# Patient Record
Sex: FEMALE | Race: WHITE | Employment: OTHER | URBAN - METROPOLITAN AREA
[De-identification: names, ages, dates, MRNs, and addresses within clinical notes are randomized per-mention and may not be internally consistent; named-entity substitution may affect disease eponyms.]

---

## 2022-09-26 ENCOUNTER — HOSPITAL ENCOUNTER (INPATIENT)
Age: 87
LOS: 4 days | Discharge: NURSING, REHAB OR LONG-TERM CARE | DRG: 689 | End: 2022-09-30
Attending: STUDENT IN AN ORGANIZED HEALTH CARE EDUCATION/TRAINING PROGRAM | Admitting: STUDENT IN AN ORGANIZED HEALTH CARE EDUCATION/TRAINING PROGRAM
Payer: COMMERCIAL

## 2022-09-26 ENCOUNTER — APPOINTMENT (OUTPATIENT)
Dept: CT IMAGING | Age: 87
DRG: 689 | End: 2022-09-26
Payer: COMMERCIAL

## 2022-09-26 ENCOUNTER — APPOINTMENT (OUTPATIENT)
Dept: GENERAL RADIOLOGY | Age: 87
DRG: 689 | End: 2022-09-26
Payer: COMMERCIAL

## 2022-09-26 DIAGNOSIS — N17.9 AKI (ACUTE KIDNEY INJURY) (HCC): Primary | ICD-10-CM

## 2022-09-26 DIAGNOSIS — N30.00 ACUTE CYSTITIS WITHOUT HEMATURIA: ICD-10-CM

## 2022-09-26 PROBLEM — N39.0 UTI (URINARY TRACT INFECTION): Status: ACTIVE | Noted: 2022-09-26

## 2022-09-26 LAB
A/G RATIO: 0.9 (ref 1.1–2.2)
ALBUMIN SERPL-MCNC: 3.6 G/DL (ref 3.4–5)
ALP BLD-CCNC: 116 U/L (ref 40–129)
ALT SERPL-CCNC: 19 U/L (ref 10–40)
ANION GAP SERPL CALCULATED.3IONS-SCNC: 15 MMOL/L (ref 3–16)
AST SERPL-CCNC: 24 U/L (ref 15–37)
BACTERIA: ABNORMAL /HPF
BASOPHILS ABSOLUTE: 0 K/UL (ref 0–0.2)
BASOPHILS RELATIVE PERCENT: 0.4 %
BILIRUB SERPL-MCNC: 0.4 MG/DL (ref 0–1)
BILIRUBIN URINE: NEGATIVE
BLOOD, URINE: ABNORMAL
BUN BLDV-MCNC: 38 MG/DL (ref 7–20)
CALCIUM SERPL-MCNC: 9.8 MG/DL (ref 8.3–10.6)
CHLORIDE BLD-SCNC: 103 MMOL/L (ref 99–110)
CLARITY: ABNORMAL
CO2: 21 MMOL/L (ref 21–32)
COLOR: ABNORMAL
CREAT SERPL-MCNC: 1.4 MG/DL (ref 0.6–1.2)
EOSINOPHILS ABSOLUTE: 0.1 K/UL (ref 0–0.6)
EOSINOPHILS RELATIVE PERCENT: 0.9 %
EPITHELIAL CELLS, UA: 3 /HPF (ref 0–5)
GFR AFRICAN AMERICAN: 43
GFR NON-AFRICAN AMERICAN: 36
GLUCOSE BLD-MCNC: 99 MG/DL (ref 70–99)
GLUCOSE URINE: NEGATIVE MG/DL
HCT VFR BLD CALC: 40.5 % (ref 36–48)
HEMOGLOBIN: 13.1 G/DL (ref 12–16)
HYALINE CASTS: 18 /LPF (ref 0–8)
KETONES, URINE: ABNORMAL MG/DL
LACTIC ACID, SEPSIS: 1.5 MMOL/L (ref 0.4–1.9)
LEUKOCYTE ESTERASE, URINE: ABNORMAL
LYMPHOCYTES ABSOLUTE: 0.5 K/UL (ref 1–5.1)
LYMPHOCYTES RELATIVE PERCENT: 4.2 %
MCH RBC QN AUTO: 31.1 PG (ref 26–34)
MCHC RBC AUTO-ENTMCNC: 32.5 G/DL (ref 31–36)
MCV RBC AUTO: 95.8 FL (ref 80–100)
MICROSCOPIC EXAMINATION: YES
MONOCYTES ABSOLUTE: 0.8 K/UL (ref 0–1.3)
MONOCYTES RELATIVE PERCENT: 6.4 %
NEUTROPHILS ABSOLUTE: 11.3 K/UL (ref 1.7–7.7)
NEUTROPHILS RELATIVE PERCENT: 88.1 %
NITRITE, URINE: POSITIVE
PDW BLD-RTO: 14.5 % (ref 12.4–15.4)
PH UA: 5 (ref 5–8)
PLATELET # BLD: 229 K/UL (ref 135–450)
PMV BLD AUTO: 7.8 FL (ref 5–10.5)
POTASSIUM SERPL-SCNC: 4.7 MMOL/L (ref 3.5–5.1)
PROTEIN UA: 300 MG/DL
RBC # BLD: 4.23 M/UL (ref 4–5.2)
RBC UA: 21 /HPF (ref 0–4)
SODIUM BLD-SCNC: 139 MMOL/L (ref 136–145)
SPECIFIC GRAVITY UA: 1.02 (ref 1–1.03)
TOTAL CK: 91 U/L (ref 26–192)
TOTAL PROTEIN: 7.6 G/DL (ref 6.4–8.2)
TROPONIN: 0.03 NG/ML
TROPONIN: 0.03 NG/ML
URINE REFLEX TO CULTURE: YES
URINE TYPE: ABNORMAL
UROBILINOGEN, URINE: 1 E.U./DL
WBC # BLD: 12.9 K/UL (ref 4–11)
WBC UA: 658 /HPF (ref 0–5)

## 2022-09-26 PROCEDURE — 82550 ASSAY OF CK (CPK): CPT

## 2022-09-26 PROCEDURE — 96374 THER/PROPH/DIAG INJ IV PUSH: CPT

## 2022-09-26 PROCEDURE — 99285 EMERGENCY DEPT VISIT HI MDM: CPT

## 2022-09-26 PROCEDURE — 96361 HYDRATE IV INFUSION ADD-ON: CPT

## 2022-09-26 PROCEDURE — 87077 CULTURE AEROBIC IDENTIFY: CPT

## 2022-09-26 PROCEDURE — 80053 COMPREHEN METABOLIC PANEL: CPT

## 2022-09-26 PROCEDURE — 93005 ELECTROCARDIOGRAM TRACING: CPT | Performed by: PHYSICIAN ASSISTANT

## 2022-09-26 PROCEDURE — 6360000002 HC RX W HCPCS: Performed by: PHYSICIAN ASSISTANT

## 2022-09-26 PROCEDURE — 81001 URINALYSIS AUTO W/SCOPE: CPT

## 2022-09-26 PROCEDURE — 85025 COMPLETE CBC W/AUTO DIFF WBC: CPT

## 2022-09-26 PROCEDURE — 84484 ASSAY OF TROPONIN QUANT: CPT

## 2022-09-26 PROCEDURE — 1200000000 HC SEMI PRIVATE

## 2022-09-26 PROCEDURE — 87086 URINE CULTURE/COLONY COUNT: CPT

## 2022-09-26 PROCEDURE — 87186 SC STD MICRODIL/AGAR DIL: CPT

## 2022-09-26 PROCEDURE — 83605 ASSAY OF LACTIC ACID: CPT

## 2022-09-26 PROCEDURE — 74176 CT ABD & PELVIS W/O CONTRAST: CPT

## 2022-09-26 PROCEDURE — 71045 X-RAY EXAM CHEST 1 VIEW: CPT

## 2022-09-26 PROCEDURE — 72125 CT NECK SPINE W/O DYE: CPT

## 2022-09-26 PROCEDURE — 87040 BLOOD CULTURE FOR BACTERIA: CPT

## 2022-09-26 PROCEDURE — 70450 CT HEAD/BRAIN W/O DYE: CPT

## 2022-09-26 PROCEDURE — 2580000003 HC RX 258: Performed by: PHYSICIAN ASSISTANT

## 2022-09-26 PROCEDURE — 36415 COLL VENOUS BLD VENIPUNCTURE: CPT

## 2022-09-26 RX ORDER — 0.9 % SODIUM CHLORIDE 0.9 %
1000 INTRAVENOUS SOLUTION INTRAVENOUS ONCE
Status: COMPLETED | OUTPATIENT
Start: 2022-09-26 | End: 2022-09-26

## 2022-09-26 RX ADMIN — SODIUM CHLORIDE 1000 ML: 9 INJECTION, SOLUTION INTRAVENOUS at 19:15

## 2022-09-26 RX ADMIN — Medication 1000 MG: at 20:24

## 2022-09-26 ASSESSMENT — PAIN SCALES - GENERAL: PAINLEVEL_OUTOF10: 8

## 2022-09-26 ASSESSMENT — ENCOUNTER SYMPTOMS
NAUSEA: 0
RHINORRHEA: 0
COUGH: 0
ABDOMINAL PAIN: 0
SHORTNESS OF BREATH: 0
VOMITING: 0
DIARRHEA: 0

## 2022-09-26 ASSESSMENT — PAIN - FUNCTIONAL ASSESSMENT: PAIN_FUNCTIONAL_ASSESSMENT: 0-10

## 2022-09-26 NOTE — ED NOTES
Pt has a Carty in place which she arrived with. Pt reports it was changed 3 days ago, catheter appears to be older than 3 days, tubing is discolored and soiled. Pt reports she has the catheter because the people where she lives \"experiment\" on her. Carty is draining cloudy dark yellow urine.       Sakshi Lozano RN  09/26/22 6620

## 2022-09-26 NOTE — ED NOTES
Daughter Musa Torres arrived and she reports pt was released from [facility] on Friday 9/23/2022 after being inpatient for 1 month. Pt was then transferred to [facility] and has been there for 3 days until today. She reports there has been some confusion in pt, dx of dementia has not been made but they are aware she has been becoming more confused. Pt was dc from  with Carty in place now.       Leda Cohen RN  09/26/22 1924

## 2022-09-27 PROBLEM — I10 HTN (HYPERTENSION): Status: ACTIVE | Noted: 2022-09-27

## 2022-09-27 PROBLEM — R53.1 GENERALIZED WEAKNESS: Status: ACTIVE | Noted: 2022-09-27

## 2022-09-27 PROBLEM — E03.9 HYPOTHYROIDISM: Status: ACTIVE | Noted: 2022-09-27

## 2022-09-27 LAB
A/G RATIO: 0.9 (ref 1.1–2.2)
ALBUMIN SERPL-MCNC: 3.2 G/DL (ref 3.4–5)
ALP BLD-CCNC: 98 U/L (ref 40–129)
ALT SERPL-CCNC: 14 U/L (ref 10–40)
ANION GAP SERPL CALCULATED.3IONS-SCNC: 9 MMOL/L (ref 3–16)
AST SERPL-CCNC: 15 U/L (ref 15–37)
BASOPHILS ABSOLUTE: 0.1 K/UL (ref 0–0.2)
BASOPHILS RELATIVE PERCENT: 0.6 %
BILIRUB SERPL-MCNC: 0.3 MG/DL (ref 0–1)
BUN BLDV-MCNC: 34 MG/DL (ref 7–20)
CALCIUM SERPL-MCNC: 9.2 MG/DL (ref 8.3–10.6)
CHLORIDE BLD-SCNC: 106 MMOL/L (ref 99–110)
CO2: 27 MMOL/L (ref 21–32)
CREAT SERPL-MCNC: 1 MG/DL (ref 0.6–1.2)
EKG ATRIAL RATE: 89 BPM
EKG DIAGNOSIS: NORMAL
EKG P AXIS: 80 DEGREES
EKG P-R INTERVAL: 168 MS
EKG Q-T INTERVAL: 398 MS
EKG QRS DURATION: 136 MS
EKG QTC CALCULATION (BAZETT): 484 MS
EKG R AXIS: -67 DEGREES
EKG T AXIS: 42 DEGREES
EKG VENTRICULAR RATE: 89 BPM
EOSINOPHILS ABSOLUTE: 0.2 K/UL (ref 0–0.6)
EOSINOPHILS RELATIVE PERCENT: 2.2 %
GFR AFRICAN AMERICAN: >60
GFR NON-AFRICAN AMERICAN: 53
GLUCOSE BLD-MCNC: 100 MG/DL (ref 70–99)
HCT VFR BLD CALC: 34.8 % (ref 36–48)
HEMOGLOBIN: 11.4 G/DL (ref 12–16)
LYMPHOCYTES ABSOLUTE: 0.6 K/UL (ref 1–5.1)
LYMPHOCYTES RELATIVE PERCENT: 7.3 %
MCH RBC QN AUTO: 31.5 PG (ref 26–34)
MCHC RBC AUTO-ENTMCNC: 32.8 G/DL (ref 31–36)
MCV RBC AUTO: 96 FL (ref 80–100)
MONOCYTES ABSOLUTE: 0.6 K/UL (ref 0–1.3)
MONOCYTES RELATIVE PERCENT: 7.5 %
NEUTROPHILS ABSOLUTE: 7 K/UL (ref 1.7–7.7)
NEUTROPHILS RELATIVE PERCENT: 82.4 %
PDW BLD-RTO: 14.5 % (ref 12.4–15.4)
PLATELET # BLD: 198 K/UL (ref 135–450)
PMV BLD AUTO: 7.8 FL (ref 5–10.5)
POTASSIUM REFLEX MAGNESIUM: 4 MMOL/L (ref 3.5–5.1)
RBC # BLD: 3.63 M/UL (ref 4–5.2)
SODIUM BLD-SCNC: 142 MMOL/L (ref 136–145)
TOTAL PROTEIN: 6.6 G/DL (ref 6.4–8.2)
TROPONIN: 0.04 NG/ML
WBC # BLD: 8.5 K/UL (ref 4–11)

## 2022-09-27 PROCEDURE — 94640 AIRWAY INHALATION TREATMENT: CPT

## 2022-09-27 PROCEDURE — 6360000002 HC RX W HCPCS: Performed by: NURSE PRACTITIONER

## 2022-09-27 PROCEDURE — 97530 THERAPEUTIC ACTIVITIES: CPT

## 2022-09-27 PROCEDURE — 97116 GAIT TRAINING THERAPY: CPT

## 2022-09-27 PROCEDURE — 97165 OT EVAL LOW COMPLEX 30 MIN: CPT

## 2022-09-27 PROCEDURE — 6370000000 HC RX 637 (ALT 250 FOR IP): Performed by: INTERNAL MEDICINE

## 2022-09-27 PROCEDURE — 36415 COLL VENOUS BLD VENIPUNCTURE: CPT

## 2022-09-27 PROCEDURE — 94761 N-INVAS EAR/PLS OXIMETRY MLT: CPT

## 2022-09-27 PROCEDURE — 1200000000 HC SEMI PRIVATE

## 2022-09-27 PROCEDURE — 2580000003 HC RX 258: Performed by: NURSE PRACTITIONER

## 2022-09-27 PROCEDURE — 97535 SELF CARE MNGMENT TRAINING: CPT

## 2022-09-27 PROCEDURE — 6370000000 HC RX 637 (ALT 250 FOR IP): Performed by: STUDENT IN AN ORGANIZED HEALTH CARE EDUCATION/TRAINING PROGRAM

## 2022-09-27 PROCEDURE — 93010 ELECTROCARDIOGRAM REPORT: CPT | Performed by: INTERNAL MEDICINE

## 2022-09-27 PROCEDURE — 2700000000 HC OXYGEN THERAPY PER DAY

## 2022-09-27 PROCEDURE — 85025 COMPLETE CBC W/AUTO DIFF WBC: CPT

## 2022-09-27 PROCEDURE — 84484 ASSAY OF TROPONIN QUANT: CPT

## 2022-09-27 PROCEDURE — 6360000002 HC RX W HCPCS: Performed by: INTERNAL MEDICINE

## 2022-09-27 PROCEDURE — 97161 PT EVAL LOW COMPLEX 20 MIN: CPT

## 2022-09-27 PROCEDURE — 80053 COMPREHEN METABOLIC PANEL: CPT

## 2022-09-27 RX ORDER — LANOLIN ALCOHOL/MO/W.PET/CERES
6 CREAM (GRAM) TOPICAL NIGHTLY PRN
COMMUNITY

## 2022-09-27 RX ORDER — ACETAMINOPHEN 650 MG/1
650 SUPPOSITORY RECTAL EVERY 6 HOURS PRN
Status: DISCONTINUED | OUTPATIENT
Start: 2022-09-27 | End: 2022-09-30 | Stop reason: HOSPADM

## 2022-09-27 RX ORDER — LEVOTHYROXINE SODIUM 112 UG/1
112 TABLET ORAL DAILY
COMMUNITY

## 2022-09-27 RX ORDER — TROSPIUM CHLORIDE 20 MG/1
20 TABLET, FILM COATED ORAL DAILY
Status: DISCONTINUED | OUTPATIENT
Start: 2022-09-27 | End: 2022-09-30 | Stop reason: HOSPADM

## 2022-09-27 RX ORDER — SENNA AND DOCUSATE SODIUM 50; 8.6 MG/1; MG/1
2 TABLET, FILM COATED ORAL NIGHTLY
Status: DISCONTINUED | OUTPATIENT
Start: 2022-09-27 | End: 2022-09-30 | Stop reason: HOSPADM

## 2022-09-27 RX ORDER — SENNA AND DOCUSATE SODIUM 50; 8.6 MG/1; MG/1
2 TABLET, FILM COATED ORAL NIGHTLY
Status: ON HOLD | COMMUNITY
End: 2022-09-29 | Stop reason: HOSPADM

## 2022-09-27 RX ORDER — NIFEDIPINE 20 MG/1
60 CAPSULE ORAL DAILY
Status: ON HOLD | COMMUNITY
End: 2022-09-27

## 2022-09-27 RX ORDER — VITAMIN E 268 MG
400 CAPSULE ORAL DAILY
COMMUNITY

## 2022-09-27 RX ORDER — ENOXAPARIN SODIUM 100 MG/ML
40 INJECTION SUBCUTANEOUS DAILY
Status: DISCONTINUED | OUTPATIENT
Start: 2022-09-27 | End: 2022-09-30 | Stop reason: HOSPADM

## 2022-09-27 RX ORDER — SODIUM CHLORIDE, SODIUM LACTATE, POTASSIUM CHLORIDE, CALCIUM CHLORIDE 600; 310; 30; 20 MG/100ML; MG/100ML; MG/100ML; MG/100ML
INJECTION, SOLUTION INTRAVENOUS CONTINUOUS
Status: DISCONTINUED | OUTPATIENT
Start: 2022-09-27 | End: 2022-09-27

## 2022-09-27 RX ORDER — OXYCODONE AND ACETAMINOPHEN 10; 325 MG/1; MG/1
1 TABLET ORAL EVERY 12 HOURS
Status: DISCONTINUED | OUTPATIENT
Start: 2022-09-27 | End: 2022-09-28

## 2022-09-27 RX ORDER — SODIUM CHLORIDE 0.9 % (FLUSH) 0.9 %
5-40 SYRINGE (ML) INJECTION PRN
Status: DISCONTINUED | OUTPATIENT
Start: 2022-09-27 | End: 2022-09-30 | Stop reason: HOSPADM

## 2022-09-27 RX ORDER — POLYETHYLENE GLYCOL 3350 17 G/17G
17 POWDER, FOR SOLUTION ORAL DAILY
Status: DISCONTINUED | OUTPATIENT
Start: 2022-09-28 | End: 2022-09-30 | Stop reason: HOSPADM

## 2022-09-27 RX ORDER — ONDANSETRON 2 MG/ML
4 INJECTION INTRAMUSCULAR; INTRAVENOUS EVERY 6 HOURS PRN
Status: DISCONTINUED | OUTPATIENT
Start: 2022-09-27 | End: 2022-09-30 | Stop reason: HOSPADM

## 2022-09-27 RX ORDER — QUETIAPINE FUMARATE 25 MG/1
12.5 TABLET, FILM COATED ORAL EVERY 6 HOURS PRN
COMMUNITY

## 2022-09-27 RX ORDER — QUETIAPINE FUMARATE 25 MG/1
12.5 TABLET, FILM COATED ORAL EVERY 6 HOURS PRN
Status: DISCONTINUED | OUTPATIENT
Start: 2022-09-27 | End: 2022-09-30 | Stop reason: HOSPADM

## 2022-09-27 RX ORDER — LEVOTHYROXINE SODIUM 112 UG/1
112 TABLET ORAL DAILY
Status: DISCONTINUED | OUTPATIENT
Start: 2022-09-27 | End: 2022-09-30 | Stop reason: HOSPADM

## 2022-09-27 RX ORDER — TAMSULOSIN HYDROCHLORIDE 0.4 MG/1
0.4 CAPSULE ORAL NIGHTLY
COMMUNITY

## 2022-09-27 RX ORDER — ALBUTEROL SULFATE 90 UG/1
2 AEROSOL, METERED RESPIRATORY (INHALATION) PRN
Status: DISCONTINUED | OUTPATIENT
Start: 2022-09-27 | End: 2022-09-29

## 2022-09-27 RX ORDER — SOLIFENACIN SUCCINATE 5 MG/1
5 TABLET, FILM COATED ORAL DAILY
COMMUNITY

## 2022-09-27 RX ORDER — LANOLIN ALCOHOL/MO/W.PET/CERES
6 CREAM (GRAM) TOPICAL NIGHTLY PRN
Status: DISCONTINUED | OUTPATIENT
Start: 2022-09-27 | End: 2022-09-30 | Stop reason: HOSPADM

## 2022-09-27 RX ORDER — ONDANSETRON 4 MG/1
4 TABLET, ORALLY DISINTEGRATING ORAL EVERY 8 HOURS PRN
Status: DISCONTINUED | OUTPATIENT
Start: 2022-09-27 | End: 2022-09-30 | Stop reason: HOSPADM

## 2022-09-27 RX ORDER — HYDROCODONE BITARTRATE AND ACETAMINOPHEN 7.5; 325 MG/1; MG/1
1 TABLET ORAL EVERY 6 HOURS PRN
Status: DISCONTINUED | OUTPATIENT
Start: 2022-09-27 | End: 2022-09-28

## 2022-09-27 RX ORDER — QUETIAPINE FUMARATE 25 MG/1
25 TABLET, FILM COATED ORAL NIGHTLY
COMMUNITY

## 2022-09-27 RX ORDER — ACETAMINOPHEN 325 MG/1
650 TABLET ORAL EVERY 6 HOURS PRN
Status: DISCONTINUED | OUTPATIENT
Start: 2022-09-27 | End: 2022-09-30 | Stop reason: HOSPADM

## 2022-09-27 RX ORDER — NIFEDIPINE 30 MG/1
60 TABLET, EXTENDED RELEASE ORAL DAILY
Status: DISCONTINUED | OUTPATIENT
Start: 2022-09-27 | End: 2022-09-30 | Stop reason: HOSPADM

## 2022-09-27 RX ORDER — SODIUM CHLORIDE 0.9 % (FLUSH) 0.9 %
5-40 SYRINGE (ML) INJECTION EVERY 12 HOURS SCHEDULED
Status: DISCONTINUED | OUTPATIENT
Start: 2022-09-27 | End: 2022-09-30 | Stop reason: HOSPADM

## 2022-09-27 RX ORDER — OXYCODONE AND ACETAMINOPHEN 10; 325 MG/1; MG/1
1 TABLET ORAL EVERY 12 HOURS
COMMUNITY

## 2022-09-27 RX ORDER — ALBUTEROL SULFATE 90 UG/1
2 AEROSOL, METERED RESPIRATORY (INHALATION) 2 TIMES DAILY
Status: DISCONTINUED | OUTPATIENT
Start: 2022-09-27 | End: 2022-09-29

## 2022-09-27 RX ORDER — ENOXAPARIN SODIUM 100 MG/ML
30 INJECTION SUBCUTANEOUS DAILY
Status: DISCONTINUED | OUTPATIENT
Start: 2022-09-27 | End: 2022-09-27

## 2022-09-27 RX ORDER — QUETIAPINE FUMARATE 25 MG/1
12.5 TABLET, FILM COATED ORAL NIGHTLY
Status: DISCONTINUED | OUTPATIENT
Start: 2022-09-27 | End: 2022-09-30 | Stop reason: HOSPADM

## 2022-09-27 RX ORDER — NIFEDIPINE 60 MG/1
60 TABLET, EXTENDED RELEASE ORAL DAILY
COMMUNITY

## 2022-09-27 RX ORDER — POLYETHYLENE GLYCOL 3350 17 G/17G
17 POWDER, FOR SOLUTION ORAL DAILY PRN
Status: DISCONTINUED | OUTPATIENT
Start: 2022-09-27 | End: 2022-09-27

## 2022-09-27 RX ORDER — ALBUTEROL SULFATE 90 UG/1
2 AEROSOL, METERED RESPIRATORY (INHALATION) PRN
COMMUNITY

## 2022-09-27 RX ORDER — SODIUM CHLORIDE 9 MG/ML
INJECTION, SOLUTION INTRAVENOUS PRN
Status: DISCONTINUED | OUTPATIENT
Start: 2022-09-27 | End: 2022-09-30 | Stop reason: HOSPADM

## 2022-09-27 RX ORDER — TAMSULOSIN HYDROCHLORIDE 0.4 MG/1
0.4 CAPSULE ORAL NIGHTLY
Status: DISCONTINUED | OUTPATIENT
Start: 2022-09-27 | End: 2022-09-30 | Stop reason: HOSPADM

## 2022-09-27 RX ORDER — POLYETHYLENE GLYCOL 3350 17 G/17G
17 POWDER, FOR SOLUTION ORAL DAILY PRN
COMMUNITY

## 2022-09-27 RX ADMIN — MICONAZOLE NITRATE: 20 OINTMENT TOPICAL at 13:10

## 2022-09-27 RX ADMIN — Medication 10 ML: at 11:18

## 2022-09-27 RX ADMIN — OXYCODONE AND ACETAMINOPHEN 1 TABLET: 10; 325 TABLET ORAL at 22:39

## 2022-09-27 RX ADMIN — TAMSULOSIN HYDROCHLORIDE 0.4 MG: 0.4 CAPSULE ORAL at 22:40

## 2022-09-27 RX ADMIN — TROSPIUM CHLORIDE 20 MG: 20 TABLET, FILM COATED ORAL at 11:18

## 2022-09-27 RX ADMIN — Medication 2 PUFF: at 19:31

## 2022-09-27 RX ADMIN — NIFEDIPINE 60 MG: 30 TABLET, EXTENDED RELEASE ORAL at 11:18

## 2022-09-27 RX ADMIN — Medication 1000 MG: at 22:40

## 2022-09-27 RX ADMIN — QUETIAPINE FUMARATE 12.5 MG: 25 TABLET ORAL at 16:56

## 2022-09-27 RX ADMIN — SODIUM CHLORIDE, POTASSIUM CHLORIDE, SODIUM LACTATE AND CALCIUM CHLORIDE: 600; 310; 30; 20 INJECTION, SOLUTION INTRAVENOUS at 02:07

## 2022-09-27 RX ADMIN — ENOXAPARIN SODIUM 40 MG: 100 INJECTION SUBCUTANEOUS at 11:18

## 2022-09-27 RX ADMIN — Medication 10 ML: at 22:40

## 2022-09-27 RX ADMIN — QUETIAPINE FUMARATE 12.5 MG: 25 TABLET ORAL at 22:40

## 2022-09-27 RX ADMIN — STANDARDIZED SENNA CONCENTRATE AND DOCUSATE SODIUM 2 TABLET: 8.6; 5 TABLET ORAL at 22:40

## 2022-09-27 RX ADMIN — OXYCODONE AND ACETAMINOPHEN 1 TABLET: 10; 325 TABLET ORAL at 13:12

## 2022-09-27 RX ADMIN — Medication 2 PUFF: at 09:26

## 2022-09-27 RX ADMIN — SODIUM CHLORIDE, PRESERVATIVE FREE 10 ML: 5 INJECTION INTRAVENOUS at 02:07

## 2022-09-27 ASSESSMENT — PAIN DESCRIPTION - LOCATION
LOCATION: BACK;GENERALIZED
LOCATION: KNEE;GENERALIZED
LOCATION: GENERALIZED

## 2022-09-27 ASSESSMENT — PAIN DESCRIPTION - PAIN TYPE: TYPE: CHRONIC PAIN

## 2022-09-27 ASSESSMENT — PAIN SCALES - GENERAL
PAINLEVEL_OUTOF10: 9
PAINLEVEL_OUTOF10: 8
PAINLEVEL_OUTOF10: 10

## 2022-09-27 ASSESSMENT — PAIN DESCRIPTION - ORIENTATION: ORIENTATION: RIGHT;LEFT

## 2022-09-27 ASSESSMENT — PAIN DESCRIPTION - DESCRIPTORS: DESCRIPTORS: ACHING;GNAWING

## 2022-09-27 NOTE — PROGRESS NOTES
4 Eyes Skin Assessment     NAME:  Dara Zapata  YOB: 1935  MEDICAL RECORD NUMBER:  [id number]    The patient is being assess for  Admission    I agree that 2 RN's have performed a thorough Head to Toe Skin Assessment on the patient. ALL assessment sites listed below have been assessed. Areas assessed by both nurses:    Head, Face, Ears, Shoulders, Back, Chest, Arms, Elbows, Hands, Sacrum. Buttock, Coccyx, Ischium, and Legs. Feet and Heels        Does the Patient have a Wound?  No noted wound(s)       Dario Prevention initiated:  Yes   Wound Care Orders initiated:  NA    Pressure Injury (Stage 3,4, Unstageable, DTI, NWPT, and Complex wounds) if present place referral/consult order under [de-identified] NA    New and Established Ostomies if present place consult order under : NA      Nurse 1 eSignature: Electronically signed by Ronny Lopez RN on 9/27/22 at 4:27 AM EDT    **SHARE this note so that the co-signing nurse is able to place an eSignature**    Nurse 2 eSignature: Electronically signed by Juan Toledo RN on 9/27/22 at 7:37 AM EDT

## 2022-09-27 NOTE — ED PROVIDER NOTES
[facility]        Pt Name: Berenice Silvestre  MRN: [id number]  [place] 1935  Date of evaluation: 9/26/2022  Provider: Luis Daniel Chew PA-C  PCP: No primary care provider on file. Note Started: 10:31 PM EDT       DHIRAJ. I have evaluated this patient. My supervising physician was available for consultation. CHIEF COMPLAINT       Chief Complaint   Patient presents with    Fall     Pt arrived via EMS for a Fall at [facility]. Pt fell backwards and hit her head, no known LOC, pt reports she did not lose consciousness, pt awake and alert, pt able to answer orientation questions X 4 but is rambling about situations not related to current events/ appears confused. HISTORY OF PRESENT ILLNESS   (Location, Timing/Onset, Context/Setting, Quality, Duration, Modifying Factors, Severity, Associated Signs and Symptoms)  Note limiting factors. Chief Complaint: Chucho Biswas is a 81 y.o. female who presents emergency department today for evaluation for a fall which occurred shortly before arriving to the ED. The patient is from a [facility]. Apparently the patient fell backwards while trying to get out of bed, and hit her head. There is no loss of consciousness. No vomiting. There is report of another fall possibly tripping over a catheter. The patient arrives to the ED she is alert and oriented x2, does seem to have some transient confusion. Her vital signs are stable. The patient tells me that she has a Carty catheter in place and has this in place for 3 days although she is unsure why. The patient has no headaches. No visual changes. No numbness, tilling or weakness. She has no chest pain pain or shortness of breath. She has no fever or chills. No cough or congestion. No vomiting or diarrhea. She denies any urinary symptoms.   She is not on any blood thinners, no other complaints at this time. Nursing Notes were all reviewed and agreed with or any disagreements were addressed in the HPI. REVIEW OF SYSTEMS    (2-9 systems for level 4, 10 or more for level 5)     Review of Systems   Constitutional:  Negative for activity change, appetite change, chills and fever. HENT:  Negative for congestion and rhinorrhea. Respiratory:  Negative for cough and shortness of breath. Cardiovascular:  Negative for chest pain. Gastrointestinal:  Negative for abdominal pain, diarrhea, nausea and vomiting. Genitourinary:  Negative for difficulty urinating, dysuria and hematuria. Positives and Pertinent negatives as per HPI. Except as noted above in the ROS, all other systems were reviewed and negative. PAST MEDICAL HISTORY   History reviewed. No pertinent past medical history. SURGICAL HISTORY   History reviewed. No pertinent surgical history. CURRENTMEDICATIONS       Previous Medications    No medications on file         ALLERGIES     Patient has no known allergies. FAMILYHISTORY     History reviewed. No pertinent family history. SOCIAL HISTORY       Social History     Tobacco Use    Smoking status: Former     Packs/day: 1.00     Years: 10.00     Pack years: 10.00     Types: Cigarettes       SCREENINGS    Klaudia Coma Scale  Eye Opening: Spontaneous  Best Verbal Response: Oriented  Best Motor Response: Obeys commands  Couch Coma Scale Score: 15        PHYSICAL EXAM    (up to 7 for level 4, 8 or more for level 5)     ED Triage Vitals [09/26/22 1812]   BP Temp Temp Source Heart Rate Resp SpO2 Height Weight   121/75 98.6 °F (37 °C) Oral 89 16 93 % 5' 4\" (1.626 m) 145 lb (65.8 kg)       Physical Exam  Vitals and nursing note reviewed. Constitutional:       Appearance: She is well-developed. She is not diaphoretic. HENT:      Head: Normocephalic and atraumatic.       Right Ear: External ear normal.      Left Ear: External ear normal.      Nose: Nose normal. Mouth/Throat:      Mouth: Mucous membranes are dry. Pharynx: No posterior oropharyngeal erythema. Eyes:      General:         Right eye: No discharge. Left eye: No discharge. Extraocular Movements: Extraocular movements intact. Conjunctiva/sclera: Conjunctivae normal.      Pupils: Pupils are equal, round, and reactive to light. Neck:      Trachea: No tracheal deviation. Cardiovascular:      Rate and Rhythm: Normal rate and regular rhythm. Pulmonary:      Effort: Pulmonary effort is normal. No respiratory distress. Breath sounds: Normal breath sounds. No wheezing or rales. Abdominal:      General: Bowel sounds are normal. There is no distension. Palpations: Abdomen is soft. Tenderness: There is no abdominal tenderness. There is no guarding or rebound. Musculoskeletal:         General: Normal range of motion. Cervical back: Normal range of motion and neck supple. Skin:     General: Skin is warm and dry. Neurological:      General: No focal deficit present. Mental Status: She is alert. She is confused. GCS: GCS eye subscore is 4. GCS verbal subscore is 5. GCS motor subscore is 6. Cranial Nerves: Cranial nerves 2-12 are intact.    Psychiatric:         Behavior: Behavior normal.       DIAGNOSTIC RESULTS   LABS:    Labs Reviewed   CBC WITH AUTO DIFFERENTIAL - Abnormal; Notable for the following components:       Result Value    WBC 12.9 (*)     Neutrophils Absolute 11.3 (*)     Lymphocytes Absolute 0.5 (*)     All other components within normal limits   COMPREHENSIVE METABOLIC PANEL - Abnormal; Notable for the following components:    BUN 38 (*)     Creatinine 1.4 (*)     GFR Non- 36 (*)     GFR African American 43 (*)     Albumin/Globulin Ratio 0.9 (*)     All other components within normal limits   URINALYSIS WITH REFLEX TO CULTURE - Abnormal; Notable for the following components:    Color, UA DARK YELLOW (*)     Clarity, UA TURBID (*)     Ketones, Urine TRACE (*)     Blood, Urine MODERATE (*)     Nitrite, Urine POSITIVE (*)     Leukocyte Esterase, Urine LARGE (*)     All other components within normal limits   MICROSCOPIC URINALYSIS - Abnormal; Notable for the following components:    Bacteria, UA 3+ (*)     Hyaline Casts, UA 18 (*)     WBC,  (*)     RBC, UA 21 (*)     All other components within normal limits   TROPONIN - Abnormal; Notable for the following components:    Troponin 0.03 (*)     All other components within normal limits   TROPONIN - Abnormal; Notable for the following components:    Troponin 0.03 (*)     All other components within normal limits   CULTURE, URINE   CULTURE, BLOOD 2   CULTURE, BLOOD 1   LACTATE, SEPSIS   CK       When ordered only abnormal lab results are displayed. All other labs were within normal range or not returned as of this dictation. EKG: When ordered, EKG's are interpreted by the Emergency Department Physician in the absence of a cardiologist.  Please see their note for interpretation of EKG. RADIOLOGY:   Non-plain film images such as CT, Ultrasound and MRI are read by the radiologist. Plain radiographic images are visualized and preliminarily interpreted by the ED Provider with the below findings:        Interpretation per the Radiologist below, if available at the time of this note:    XR CHEST PORTABLE   Final Result   No acute process. CT HEAD WO CONTRAST   Final Result   Head CT: No acute intracranial abnormality detected. Cervical spine CT: No acute fracture or traumatic malalignment. CT CERVICAL SPINE WO CONTRAST   Final Result   Head CT: No acute intracranial abnormality detected. Cervical spine CT: No acute fracture or traumatic malalignment. CT ABDOMEN PELVIS WO CONTRAST Additional Contrast? None   Final Result   Urinary bladder mural thickening with mild surrounding fat stranding,   suggesting cystitis. Carty catheter is in place. suspicious renal abnormality identified. No hydronephrosis or hydroureter. GI/Bowel: Small hiatal hernia. Stomach otherwise unremarkable. Small bowel unremarkable. There is  moderate diverticulosis of the large bowel, but without convincing CT evidence of diverticulitis. Appendix is normal. Pelvis: Calcified uterine fibroids present. Uterus and adnexa regions otherwise unremarkable for age. No free pelvic fluid. Urinary bladder mural thickening is seen with mild surrounding fat stranding. Carty catheter is in place. Peritoneum/Retroperitoneum: Abdominal aorta normal in caliber. No retroperitoneal lymphadenopathy. Bones/Soft Tissues: No acute or suspicious bony abnormalities are identified. The extra-abdominal and extra pelvic soft tissues are unremarkable. Urinary bladder mural thickening with mild surrounding fat stranding, suggesting cystitis. Carty catheter is in place. Otherwise, no acute abnormality identified. Moderate diverticulosis of the large bowel, but without CT evidence of diverticulitis. Fibroid uterus. CT HEAD WO CONTRAST    Result Date: 9/26/2022  EXAMINATION: CT OF THE HEAD WITHOUT CONTRAST; CT OF THE CERVICAL SPINE WITHOUT CONTRAST 9/26/2022 3:56 pm; 9/26/2022 3:57 pm TECHNIQUE: CT of the head was performed without the administration of intravenous contrast. Automated exposure control, iterative reconstruction, and/or weight based adjustment of the mA/kV was utilized to reduce the radiation dose to as low as reasonably achievable.; CT of the cervical spine was performed without the administration of intravenous contrast. Multiplanar reformatted images are provided for review. Automated exposure control, iterative reconstruction, and/or weight based adjustment of the mA/kV was utilized to reduce the radiation dose to as low as reasonably achievable. COMPARISON: None.  HISTORY: ORDERING SYSTEM PROVIDED HISTORY: fall TECHNOLOGIST PROVIDED HISTORY: Reason for exam:->fall Has a \"code stroke\" or \"stroke alert\" been called? ->No Decision Support Exception - unselect if not a suspected or confirmed emergency medical condition->Emergency Medical Condition (MA) Reason for Exam: Fall, Fall (Pt arrived via EMS for a Fall at [facility]. Pt fell backwards and hit her head, no known LOC, pt reports she did not lose consciousness, pt awake and alert, pt able to answer orientation questions X 4 but is rambling about situations not related to current events/ appears confused. ).; ORDERING SYSTEM PROVIDED HISTORY: fall TECHNOLOGIST PROVIDED HISTORY: Reason for exam:->fall Decision Support Exception - unselect if not a suspected or confirmed emergency medical condition->Emergency Medical Condition (MA) Reason for Exam: Fall, Fall (Pt arrived via EMS for a Fall at [facility]. Pt fell backwards and hit her head, no known LOC, pt reports she did not lose consciousness, pt awake and alert, pt able to answer orientation questions X 4 but is rambling about situations not related to current events/ appears confused. ). FINDINGS: HEAD CT: BRAIN/VENTRICLES:  No acute loss of the gray-white matter differentiation is identified to suggest acute or subacute infarct. No masses or hemorrhages within the brain parenchyma are found. No evidence of midline shift. There is mild periventricular low-attenuation, compatible with chronic small vessel ischemic disease. The intracranial vasculature, including the dural venous sinuses, is within normal limits. ORBITS: No acute orbital abnormalities are identified. SINUSES: The visualized paranasal sinuses and mastoid air cells are clear. SOFT TISSUES/SKULL: The calvarium is intact. Extracranial soft tissues are unremarkable. CERVICAL SPINE CT: BONES/ALIGNMENT: No acute fracture or traumatic malalignment is identified within the cervical spine. Chronic appearing compression deformities involving the T3 and T4 vertebral bodies is noted. DEGENERATIVE CHANGES: Multilevel degenerative disc and facet disease noted. No high-grade central canal stenosis is found. SOFT TISSUES: The prevertebral soft tissues are unremarkable. Emphysema is noted within1 the visualized lungs. Biapical pleural scarring is noted. Head CT: No acute intracranial abnormality detected. Cervical spine CT: No acute fracture or traumatic malalignment. CT CERVICAL SPINE WO CONTRAST    Result Date: 9/26/2022  EXAMINATION: CT OF THE HEAD WITHOUT CONTRAST; CT OF THE CERVICAL SPINE WITHOUT CONTRAST 9/26/2022 3:56 pm; 9/26/2022 3:57 pm TECHNIQUE: CT of the head was performed without the administration of intravenous contrast. Automated exposure control, iterative reconstruction, and/or weight based adjustment of the mA/kV was utilized to reduce the radiation dose to as low as reasonably achievable.; CT of the cervical spine was performed without the administration of intravenous contrast. Multiplanar reformatted images are provided for review. Automated exposure control, iterative reconstruction, and/or weight based adjustment of the mA/kV was utilized to reduce the radiation dose to as low as reasonably achievable. COMPARISON: None. HISTORY: ORDERING SYSTEM PROVIDED HISTORY: fall TECHNOLOGIST PROVIDED HISTORY: Reason for exam:->fall Has a \"code stroke\" or \"stroke alert\" been called? ->No Decision Support Exception - unselect if not a suspected or confirmed emergency medical condition->Emergency Medical Condition (MA) Reason for Exam: Fall, Fall (Pt arrived via EMS for a Fall at [facility]. Pt fell backwards and hit her head, no known LOC, pt reports she did not lose consciousness, pt awake and alert, pt able to answer orientation questions X 4 but is rambling about situations not related to current events/ appears confused.  ).; ORDERING SYSTEM PROVIDED HISTORY: fall TECHNOLOGIST PROVIDED HISTORY: Reason for exam:->fall Decision Support Exception - unselect if not a suspected or confirmed emergency medical condition->Emergency Medical Condition (MA) Reason for Exam: Fall, Fall (Pt arrived via EMS for a Fall at [facility]. Pt fell backwards and hit her head, no known LOC, pt reports she did not lose consciousness, pt awake and alert, pt able to answer orientation questions X 4 but is rambling about situations not related to current events/ appears confused. ). FINDINGS: HEAD CT: BRAIN/VENTRICLES:  No acute loss of the gray-white matter differentiation is identified to suggest acute or subacute infarct. No masses or hemorrhages within the brain parenchyma are found. No evidence of midline shift. There is mild periventricular low-attenuation, compatible with chronic small vessel ischemic disease. The intracranial vasculature, including the dural venous sinuses, is within normal limits. ORBITS: No acute orbital abnormalities are identified. SINUSES: The visualized paranasal sinuses and mastoid air cells are clear. SOFT TISSUES/SKULL: The calvarium is intact. Extracranial soft tissues are unremarkable. CERVICAL SPINE CT: BONES/ALIGNMENT: No acute fracture or traumatic malalignment is identified within the cervical spine. Chronic appearing compression deformities involving the T3 and T4 vertebral bodies is noted. DEGENERATIVE CHANGES: Multilevel degenerative disc and facet disease noted. No high-grade central canal stenosis is found. SOFT TISSUES: The prevertebral soft tissues are unremarkable. Emphysema is noted within1 the visualized lungs. Biapical pleural scarring is noted. Head CT: No acute intracranial abnormality detected. Cervical spine CT: No acute fracture or traumatic malalignment. XR CHEST PORTABLE    Result Date: 9/26/2022  EXAMINATION: ONE XRAY VIEW OF THE CHEST 9/26/2022 6:53 pm COMPARISON: None.  HISTORY: ORDERING SYSTEM PROVIDED HISTORY: fall TECHNOLOGIST PROVIDED HISTORY: Reason for exam:->fall Reason for Exam: fall FINDINGS: The lungs are without acute focal process. There is no effusion or pneumothorax. The cardiomediastinal silhouette is without acute process. The osseous structures are without acute process. No acute process. PROCEDURES   Unless otherwise noted below, none     Procedures    CRITICAL CARE TIME       CONSULTS:  None      EMERGENCY DEPARTMENT COURSE and DIFFERENTIAL DIAGNOSIS/MDM:   Vitals:    Vitals:    09/26/22 1812 09/26/22 2136 09/26/22 2215   BP: 121/75 135/66 134/75   Pulse: 89 88 86   Resp: 16 18 18   Temp: 98.6 °F (37 °C)     TempSrc: Oral     SpO2: 93% 99% 90%   Weight: 145 lb (65.8 kg)     Height: 5' 4\" (1.626 m)         Patient was given the following medications:  Medications   cefTRIAXone (ROCEPHIN) 1000 mg in sterile water 10 mL IV syringe (1,000 mg IntraVENous Given 9/26/22 2024)   0.9 % sodium chloride bolus (0 mLs IntraVENous Stopped 9/26/22 2024)         Is this patient to be included in the SEP-1 Core Measure due to severe sepsis or septic shock? No   Exclusion criteria - the patient is NOT to be included for SEP-1 Core Measure due to:  May have criteria for sepsis, but does not meet criteria for severe sepsis or septic shock    Briefly, this is a 81-year-old female who presents to the emergency department today for evaluation for a fall from a nursing home. The patient apparently fell, while trying to get out of bed, she did hit her head, no loss of consciousness. No vomiting. Patient arrives to the ED she appears to be mildly confused alert and oriented x3. Mucous membranes are dry. Patient has a Carty catheter in place, she tells me that this has been in place for approximately 3 days although she is unsure why. There are no neurological deficits. EKG seconded by my attending, please see his note for further details. CBC shows leukocytosis of 12.9 there is no evidence of anemia.   CMP does show a BUN of 38, creatinine of 1.4, urine does show infection, will cover with IV fluids, as well as Rocephin. Troponin is elevated however likely secondary to demand. Chest x-ray is negative. CT of head and cervical spine are negative for acute process. CT shows cystitis, otherwise is unremarkable. The patient's MANDY, as well as acute cystitis, and mild confusion, she will need to be admitted for further care and evaluation, hospitalist has been paged for admission patient family updated, agreeable with plan, stable for admission. FINAL IMPRESSION      1. MANDY (acute kidney injury) ([facility] [address]    2. Acute cystitis without hematuria          DISPOSITION/PLAN   DISPOSITION Decision To Admit 09/26/2022 09:05:39 PM      PATIENT REFERRED TO:  No follow-up provider specified.     DISCHARGE MEDICATIONS:  New Prescriptions    No medications on file       DISCONTINUED MEDICATIONS:  Discontinued Medications    No medications on file              (Please note that portions of this note were completed with a voice recognition program.  Efforts were made to edit the dictations but occasionally words are mis-transcribed.)    Low Sampson PA-C (electronically signed)            Low Sampson PA-C  09/26/22 1455

## 2022-09-27 NOTE — PROGRESS NOTES
Hospitalist Progress Note      Date of Admission: 9/26/2022    Hospital Course:   Sent to the emergency room after a fall at [facility]. Patient fell backwards landing on her tailbone and striking her head. There was no loss of consciousness     Subjective:  Doing well. Only complaint is pain in her tailbone after her fall. Medications:    Infusion Medications    sodium chloride      lactated ringers 100 mL/hr at 09/27/22 0439       Scheduled Medications    sodium chloride flush  5-40 mL IntraVENous 2 times per day    enoxaparin  30 mg SubCUTAneous Daily    levothyroxine  112 mcg Oral Daily    NIFEdipine  60 mg Oral Daily    QUEtiapine  12.5 mg Oral Nightly    sennosides-docusate sodium  2 tablet Oral Nightly    tamsulosin  0.4 mg Oral Nightly    miconazole nitrate   Topical BID    trospium  20 mg Oral Daily    albuterol sulfate HFA  2 puff Inhalation BID    cefTRIAXone (ROCEPHIN) IV  1,000 mg IntraVENous Q24H       Physical Exam Performed:  BP (!) 158/57   Pulse 80   Temp 98.2 °F (36.8 °C)   Resp 16   Ht 5' 4\" (1.626 m)   Wt 127 lb 14.4 oz (58 kg)   SpO2 95%   BMI 21.95 kg/m²     Intake/Output Summary (Last 24 hours) at 9/27/2022 0725  Last data filed at 9/27/2022 0439  Gross per 24 hour   Intake 1248.37 ml   Output 450 ml   Net 798.37 ml     Room air    GEN:  Appearance:  pleaseant elderly female in NAD sitting up in bedside chair  Level of Consciousness:  alert . Orientation:  self and \"[facility]\"    HEENT: Sclera anicteric.  no conjunctival chemosis. moist mucus membranes. no specific or diagnostic oral lesions. NECK:  no signs of meningismus. Jugular veins not distended. Carotid pulses  2+.  no cervical lymphadenopathy. no thyromegaly. CV:  regular rhythm. normal S1 & S2.    no murmur. no rub.  no gallop. PULM:  Chest excursion is symmetric. Breath sounds are vesicular.     Adventitious sounds:  none    AB:  Abdominal shape is normal.  Bowel sounds are active. Generally soft to palpation. no tenderness is present. no involuntary guarding. no rebound guarding. RECTAL:   :  Carty in situ. Cloudy orange-tinted urine. EXTR:  Skin is warm. Capillary refill brisk. no specific or pathognomic rash. no clubbing. no pitting edema. no active wound or ulcer. Pulses 2+ x 4    Labs:   Recent Labs     09/26/22 1827 09/27/22 0628   WBC 12.9* 8.5   HGB 13.1 11.4*   HCT 40.5 34.8*    198     Recent Labs     09/26/22 1827 09/27/22 0628    142   K 4.7 4.0    106   CO2 21 27   BUN 38* 34*   CREATININE 1.4* 1.0   CALCIUM 9.8 9.2     Recent Labs     09/26/22 1827 09/27/22 0628   AST 24 15   ALT 19 14   BILITOT 0.4 0.3   ALKPHOS 116 98     No results for input(s): INR in the last 72 hours. Recent Labs     09/26/22 1827 09/26/22 2039 09/27/22 0628   CKTOTAL 91  --   --    TROPONINI 0.03* 0.03* 0.04*       Urinalysis:      Lab Results   Component Value Date/Time    NITRU POSITIVE 09/26/2022 06:29 PM    WBCUA 658 09/26/2022 06:29 PM    BACTERIA 3+ 09/26/2022 06:29 PM    RBCUA 21 09/26/2022 06:29 PM    BLOODU MODERATE 09/26/2022 06:29 PM    SPECGRAV 1.020 09/26/2022 06:29 PM    GLUCOSEU Negative 09/26/2022 06:29 PM       Radiology:  XR CHEST PORTABLE   Final Result   No acute process. CT HEAD WO CONTRAST   Final Result   Head CT: No acute intracranial abnormality detected. Cervical spine CT: No acute fracture or traumatic malalignment. CT CERVICAL SPINE WO CONTRAST   Final Result   Head CT: No acute intracranial abnormality detected. Cervical spine CT: No acute fracture or traumatic malalignment. CT ABDOMEN PELVIS WO CONTRAST Additional Contrast? None   Final Result   Urinary bladder mural thickening with mild surrounding fat stranding,   suggesting cystitis. Carty catheter is in place. Otherwise, no acute abnormality identified.       Moderate diverticulosis of the large bowel, but without CT evidence of   diverticulitis. Fibroid uterus. Active Hospital Problems    Diagnosis     HTN (hypertension) [I10]      Priority: Medium    Hypothyroidism [E03.9]      Priority: Medium    Generalized weakness [R53.1]      Priority: Medium    UTI (urinary tract infection) [N39.0]      Priority: Medium       Assessment/Plan:  UTI  -  Empiric ceftriaxone 1g q24h started 9/26.  -  Urine Cx 9/26:  Citrobacter freundii > 100k CFU/mL and Klebsiella oxytoca 50k CFU/mL. Sensitivities pending.  -  Carty catheter placed at the time of admission. Voiding trial and removal when able. Tailbone pain  -  Hydrocodone/APAP 7.5/325mg made available q6h prn for pain and daily miralax also started. Continue home senna-docusate as well. HTN  -  Continue home nifedipine. Hypothyroidism  -  Continue home levothyroxine 112 mcg daily. DVT Prophylaxis: SCDs, lovenox  Diet: ADULT DIET; Dysphagia - Soft and Bite Sized  Code Status: Full Code    PT/OT Eval Status: Scored a 15/24 on the AM-PAC short mobility form. It is recommended that the patient have 3-5 sessions per week      Dispo - Return to the [facility] when ready, likely 9/28 or 9/29 depending on micro data and antibiotic choices.     Wojciech Mullins MD

## 2022-09-27 NOTE — PLAN OF CARE
Problem: Discharge Planning  Goal: Discharge to home or other facility with appropriate resources  9/27/2022 0406 by Ester Fraire RN  Outcome: Progressing  9/27/2022 0406 by Ester Fraire RN  Outcome: Progressing     Problem: Pain  Goal: Verbalizes/displays adequate comfort level or baseline comfort level  9/27/2022 0406 by Ester Fraire RN  Outcome: Progressing  9/27/2022 0406 by Ester Fraire RN  Outcome: Progressing     Problem: Safety - Adult  Goal: Free from fall injury  Outcome: Progressing

## 2022-09-27 NOTE — H&P
HOSPITALISTS HISTORY AND PHYSICAL    9/26/2022 11:06 PM    Patient Information:  Bhaskar Griffin is a 81 y.o. female [id number]  PCP:  No primary care provider on file. (Tel: None )    Chief complaint:    Chief Complaint   Patient presents with    Fall     Pt arrived via EMS for a Fall at [facility]. Pt fell backwards and hit her head, no known LOC, pt reports she did not lose consciousness, pt awake and alert, pt able to answer orientation questions X 4 but is rambling about situations not related to current events/ appears confused. History of Present Illness:  Tressa Trujillo is a 81 y.o. female with hx dementia, hypothyroid, HTN, legally blind, and OA. And then to the emergency room after a fall at [facility]. Patient fell backwards and hit her head according to ER notes there was no loss of consciousness. Patient does open her eyes to examiner but is unable to provide any history. She follows only minimal commands. She had a recent admission to  on 8/20/22, for AMS which had been gradual over the past year. She was initially treated for encephalitis. Her mental status was labile and she was having hallucinations. She was started on Seroquel and behaviors improved and was discharged to [facility]. History obtained from patient     REVIEW OF SYSTEMS:   Review of Systems   Unable to perform ROS: Dementia     Past Medical History:   has a past medical history of COPD (chronic obstructive pulmonary disease) ([facility] [address] Dementia ([facility] [address] Diverticulosis, Glaucoma, Hypertension, Hypothyroid, and Legal blindness. Past Surgical History:   has a past surgical history that includes eye surgery; Tonsillectomy; and joint replacement. Medications:  No current facility-administered medications on file prior to encounter.      No current outpatient medications on file prior to encounter. Allergies:  No Known Allergies     Social History:  Patient Lives [facility]   reports that she has quit smoking. Her smoking use included cigarettes. She has a 10.00 pack-year smoking history. She does not have any smokeless tobacco history on file. She reports that she does not drink alcohol and does not use drugs. Family History:  family history includes Coronary Art Dis in her father. ,     Physical Exam:  /75   Pulse 86   Temp 98.6 °F (37 °C) (Oral)   Resp 18   Ht 5' 4\" (1.626 m)   Wt 145 lb (65.8 kg)   SpO2 90%   BMI 24.89 kg/m²   Physical Exam  Vitals and nursing note reviewed. Constitutional:       Comments: Opens eyes to examiner but does not converse with examiner    HENT:      Head: Normocephalic. Mouth/Throat:      Mouth: Mucous membranes are dry. Eyes:      Pupils: Pupils are equal, round, and reactive to light. Cardiovascular:      Rate and Rhythm: Normal rate and regular rhythm. Pulses: Normal pulses. Heart sounds: No murmur heard. Pulmonary:      Effort: Pulmonary effort is normal. No respiratory distress. Breath sounds: Normal breath sounds. Abdominal:      General: Abdomen is flat. Bowel sounds are normal. There is no distension. Palpations: Abdomen is soft. Tenderness: There is no abdominal tenderness. Musculoskeletal:         General: Normal range of motion. Cervical back: Normal range of motion. Right lower leg: No edema. Left lower leg: No edema. Skin:     General: Skin is warm and dry. Capillary Refill: Capillary refill takes less than 2 seconds. Neurological:      Comments: Patient opens eyes but does not answer questions  She will only briefly squeeze examiners hands no other commands followed.         Labs:  CBC:   Lab Results   Component Value Date/Time    WBC 12.9 09/26/2022 06:27 PM    RBC 4.23 09/26/2022 06:27 PM    HGB 13.1 09/26/2022 06:27 PM    HCT 40.5 09/26/2022 06:27 PM    MCV 95.8 09/26/2022 06:27 PM    MCH 31.1 09/26/2022 06:27 PM    MCHC 32.5 09/26/2022 06:27 PM    RDW 14.5 09/26/2022 06:27 PM     09/26/2022 06:27 PM    MPV 7.8 09/26/2022 06:27 PM     BMP:    Lab Results   Component Value Date/Time     09/26/2022 06:27 PM    K 4.7 09/26/2022 06:27 PM     09/26/2022 06:27 PM    CO2 21 09/26/2022 06:27 PM    BUN 38 09/26/2022 06:27 PM    CREATININE 1.4 09/26/2022 06:27 PM    CALCIUM 9.8 09/26/2022 06:27 PM    GFRAA 43 09/26/2022 06:27 PM    LABGLOM 36 09/26/2022 06:27 PM    GLUCOSE 99 09/26/2022 06:27 PM     XR CHEST PORTABLE   Final Result   No acute process. CT HEAD WO CONTRAST   Final Result   Head CT: No acute intracranial abnormality detected. Cervical spine CT: No acute fracture or traumatic malalignment. CT CERVICAL SPINE WO CONTRAST   Final Result   Head CT: No acute intracranial abnormality detected. Cervical spine CT: No acute fracture or traumatic malalignment. CT ABDOMEN PELVIS WO CONTRAST Additional Contrast? None   Final Result   Urinary bladder mural thickening with mild surrounding fat stranding,   suggesting cystitis. Carty catheter is in place. Otherwise, no acute abnormality identified. Moderate diverticulosis of the large bowel, but without CT evidence of   diverticulitis. Fibroid uterus. Chest Xray:   EKG:    I visualized CXR images and EKG strips      Problem List  Active Problems:    * No active hospital problems. *  Resolved Problems:    * No resolved hospital problems. *        Assessment/Plan:   Acute Encephalopathy  Admit inpatient  CT head no acute intercranial abnormalities  Unclear her baseline mental status  Hx dementia  Secondary to UTI    2. Urinary tract Infection   IV rocephin  Blood cx and urine cx  Indwelling catheter for urinary retention, she was to see Urology outpatient after last admission     3. Mechanical Fall  PT, OT    4. Dementia    5.  Hypertension Bp stable   Verify meds from ECF     6. Hypothyroid   Continue home meds  Check TSH     7. Elevated Troponin  0.03 x 2   Repeat in am  Patient not specifically complaining of any chest pain   EKG bifascicular block     DVT prophylaxis Lovenox  Code status Full   Diet Dysphagia  IV access peripheral   Carlson Catheter  carlson     Admit as inpatient. I anticipate hospitalization spanning more than two midnights for investigation and treatment of the above medically necessary diagnoses. Please note that some part of this chart was generated using Dragon dictation software. Although every effort was made to ensure the accuracy of this automated transcription, some errors in transcription may have occurred inadvertently. If you may need any clarification, please do not hesitate to contact me through [facility].        GERA Babcock CNP    9/26/2022 11:06 PM

## 2022-09-27 NOTE — RT PROTOCOL NOTE
RT Inhaler-Nebulizer Bronchodilator Protocol Note    There is a bronchodilator order in the chart from a provider indicating to follow the RT Bronchodilator Protocol and there is an Initiate RT Inhaler-Nebulizer Bronchodilator Protocol order as well (see protocol at bottom of note). CXR Findings:  XR CHEST PORTABLE    Result Date: 9/26/2022  No acute process. The findings from the last RT Protocol Assessment were as follows:   History Pulmonary Disease: Chronic pulmonary disease  Respiratory Pattern: Regular pattern and RR 12-20 bpm  Breath Sounds: Slightly diminished and/or crackles  Cough: Strong, spontaneous, non-productive  Indication for Bronchodilator Therapy: Decreased or absent breath sounds, On home bronchodilators  Bronchodilator Assessment Score: 4    Aerosolized bronchodilator medication orders have been revised according to the RT Inhaler-Nebulizer Bronchodilator Protocol below. Respiratory Therapist to perform RT Therapy Protocol Assessment initially then follow the protocol. Repeat RT Therapy Protocol Assessment PRN for score 0-3 or on second treatment, BID, and PRN for scores above 3. No Indications - adjust the frequency to every 6 hours PRN wheezing or bronchospasm, if no treatments needed after 48 hours then discontinue using Per Protocol order mode. If indication present, adjust the RT bronchodilator orders based on the Bronchodilator Assessment Score as indicated below. Use Inhaler orders unless patient has one or more of the following: on home nebulizer, not able to hold breath for 10 seconds, is not alert and oriented, cannot activate and use MDI correctly, or respiratory rate 25 breaths per minute or more, then use the equivalent nebulizer order(s) with same Frequency and PRN reasons based on the score. If a patient is on this medication at home then do not decrease Frequency below that used at home.     0-3 - enter or revise RT bronchodilator order(s) to equivalent RT Bronchodilator order with Frequency of every 4 hours PRN for wheezing or increased work of breathing using Per Protocol order mode. 4-6 - enter or revise RT Bronchodilator order(s) to two equivalent RT bronchodilator orders with one order with BID Frequency and one order with Frequency of every 4 hours PRN wheezing or increased work of breathing using Per Protocol order mode. 7-10 - enter or revise RT Bronchodilator order(s) to two equivalent RT bronchodilator orders with one order with TID Frequency and one order with Frequency of every 4 hours PRN wheezing or increased work of breathing using Per Protocol order mode. 11-13 - enter or revise RT Bronchodilator order(s) to one equivalent RT bronchodilator order with QID Frequency and an Albuterol order with Frequency of every 4 hours PRN wheezing or increased work of breathing using Per Protocol order mode. Greater than 13 - enter or revise RT Bronchodilator order(s) to one equivalent RT bronchodilator order with every 4 hours Frequency and an Albuterol order with Frequency of every 2 hours PRN wheezing or increased work of breathing using Per Protocol order mode. RT to enter RT Home Evaluation for COPD & MDI Assessment order using Per Protocol order mode.     Electronically signed by Preet Schulte RCP on 9/27/2022 at 4:49 AM

## 2022-09-27 NOTE — PROGRESS NOTES
[facility]   Phone: [phone]    Occupational Therapy    [x] Initial Evaluation            [] Daily Treatment Note         [] Discharge Summary      Patient: Juno Roberson   : 1935   MRN: [id number]   Date of Service:  2022    Admitting Diagnosis:  UTI (urinary tract infection)  Current Admission Summary: Juno Roberson is a 81 y.o. female with hx dementia, hypothyroid, HTN, legally blind, and OA. And then to the emergency room after a fall at [facility]. Patient fell backwards and hit her head according to ER notes there was no loss of consciousness. Patient does open her eyes to examiner but is unable to provide any history. She follows only minimal commands. She had a recent admission to  on 22, for AMS which had been gradual over the past year. She was initially treated for encephalitis. Her mental status was labile and she was having hallucinations. She was started on Seroquel and behaviors improved and was discharged to [facility]. Past Medical History:  has a past medical history of COPD (chronic obstructive pulmonary disease) ([facility] [address] Dementia ([facility] [address] Diverticulosis, Glaucoma, Hypertension, Hypothyroid, and Legal blindness. Past Surgical History:  has a past surgical history that includes eye surgery; Tonsillectomy; and joint replacement. Discharge Recommendations: Juno Roberson scored a 15/24 on the AM-PAC ADL Inpatient form. Current research shows that an AM-PAC score of 17 or less is typically not associated with a discharge to the patient's home setting. Based on the patient's AM-PAC score and their current ADL deficits, it is recommended that the patient have 3-5 sessions per week of Occupational Therapy at d/c to increase the patient's independence. Please see assessment section for further patient specific details.     If patient discharges prior to next session this note will serve as a discharge summary. Please see below for the latest assessment towards goals. DME Required For Discharge: DME to be determined at next level of care, rolling walker    Precautions/Restrictions: high fall risk, modified diet (dysphagia soft and bite-sized)  Weight Bearing Restrictions: no restrictions  [] Right Upper Extremity  [] Left Upper Extremity [] Right Lower Extremity  [] Left Lower Extremity     Required Braces/Orthotics: no braces required   [] Right  [] Left  Positional Restrictions:no positional restrictions      Pre-Admission Information     Lives With: son    Type of Home: house  Recent Falls: at least 1 recent fall resulting in admission, multiple other falls in recent months resulting in recent admission to  as above  Comments: Pt reports she lives in a house with her son, but does not provide additional information due to confusion regarding most recent living situation. Due to pt not safe to return home at this time with need for skilled care at discharge, will hold gathering additional home environment information at this time. If pt improves to level that may allow safe discharge home, please call pt's son to gather additional information. Pt reports she has used [facility] RW before, [facility] \" Pt unsure of additional DME.        Examination     Vision:   Vision Gross Assessment: Legally blind  Hearing:   hard of hearing, no hearing aid  Perception:   Some cues needed for motor planning  Posture:   Rounded shoulders, forward head, kyphotic  Sensation:   denies numbness and tingling  Proprioception:    WFL  Tone:   Normotonic  Coordination Testing:   Coordination and Movement Description: decreased speed, decreased accuracy    ROM:   (B) UE AROM WFL for BADLs  Strength:   (B) UE strength grossly WFL for BADLs    Decision Making: low complexity  Clinical Presentation: stable      Subjective    General: Pt reclined in bed upon arrival; pleasant and agreeable to eval.   Pain: 6/10.  Location: tailbone   Pain Interventions: pain medication in place prior to arrival, RN notified, and repositioned            Activities of Daily Living    Basic Activities of Daily Living  Grooming: minimal assistance  Grooming Comments: washed hands and face, and completed oral care in stance at sink; assist required to locate objects and set up due to visual and cognitive deficits  Lower Extremity Dressing: moderate assistance  Dressing Comments: don brief  Toileting: maximum assistance. Toileting Comments: assist for pericare in stance due to pt requiring BUE support for balance  General Comments: Pt primarily limited by vision, cognition, and balance at this time. Instrumental Activities of Daily Living  No IADL completed on this date. Functional Mobility    Bed Mobility  Supine to Sit: minimal assistance  Scooting: minimal assistance  Comments: HOB slightly elevated  Transfers  Sit to stand transfer:minimal assistance, moderate assistance  Stand to sit transfer: minimal assistance  Toilet transfer: moderate assistance  Toilet transfer equipment: standard toilet, grab bars  Comments: STS min A at EOB, mod A at toilet; use of RW; posterior lean   Functional Mobility:  Sitting Balance: stand by assistance. Standing Balance: contact guard assistance, minimal assistance.     Functional Mobility: .  contact guard assistance, minimal assistance  Functional Mobility Activity: to/from bathroom  Functional Mobility Device Use: rolling walker  Functional Mobility Comment: decreased step length, posterior lean with min A-CGA needed to correct, intermittent assist for navigation and RW management     Other Therapeutic Interventions      Functional Outcomes  AM-PAC Inpatient Daily Activity Raw Score: 15      Cognition  Overall Cognitive Status: Impaired  Arousal/Alterness: appropriate responses to stimuli  Following Commands: follows one step commands with increased time  Attention Span: difficulty dividing Treatment Recommendations: balance training, functional mobility training, transfer training, endurance training, ADL/self-care training, and cognitive reorientation    Goals    Patient Goals: does not state     Short Term Goals:  Time Frame: d/c  Patient will complete upper body ADL at supervision   Patient will complete lower body ADL at stand by assistance   Patient will complete functional transfers at supervision   Patient will complete functional mobility at stand by assistance   Patient will complete bed mobility at supervision        Therapy Session Time     Individual Group Co-treatment   Time In    0937   Time Out    1031   Minutes    54        Timed Code Treatment Minutes:  Timed Code Treatment Minutes: 39 Minutes    Total Treatment Minutes:  54    Electronically Signed By: TRISH Gaytan/GILDARDO, C/NDT ([id number])

## 2022-09-27 NOTE — PROGRESS NOTES
[facility] [address]   Phone: [phone]    Physical Therapy    [] Initial Evaluation            [] Daily Treatment Note         [] Discharge Summary      Patient: Ruben Lord   : 1935   MRN: [id number]   Date of Service:  2022  Admitting Diagnosis: UTI (urinary tract infection)  Current Admission Summary: Ruben Lord is a 81 y.o. female who presents emergency department today for evaluation for a fall which occurred shortly before arriving to the ED. The patient is from a [facility]. Apparently the patient fell backwards while trying to get out of bed, and hit her head. There is no loss of consciousness. No vomiting. There is report of another fall possibly tripping over a catheter. The patient arrives to the ED she is alert and oriented x2, does seem to have some transient confusion. Her vital signs are stable. The patient tells me that she has a Carty catheter in place and has this in place for 3 days although she is unsure why. The patient has no headaches. No visual changes. No numbness, tilling or weakness. She has no chest pain pain or shortness of breath. She has no fever or chills. No cough or congestion. No vomiting or diarrhea. She denies any urinary symptoms. She is not on any blood thinners, no other complaints at this time. Past Medical History:  has a past medical history of COPD (chronic obstructive pulmonary disease) ([facility] [address] Dementia ([facility] [address] Diverticulosis, Glaucoma, Hypertension, Hypothyroid, and Legal blindness. Past Surgical History:  has a past surgical history that includes eye surgery; Tonsillectomy; and joint replacement. Discharge Recommendations: Ruben Lord scored a 15/24 on the AM-PAC short mobility form. Current research shows that an AM-PAC score of 17 or less is typically not associated with a discharge to the patient's home setting.  Based on the patient's AM-PAC score and their current functional mobility deficits, it is recommended that the patient have 3-5 sessions per week of Physical Therapy at d/c to increase the patient's independence. Please see assessment section for further patient specific details. If patient discharges prior to next session this note will serve as a discharge summary. Please see below for the latest assessment towards goals. DME Required For Discharge: DME to be determined at next level of care  Precautions/Restrictions: high fall risk  Weight Bearing Restrictions: no restrictions  [] Right Upper Extremity  [] Left Upper Extremity [] Right Lower Extremity  [] Left Lower Extremity     Required Braces/Orthotics: no braces required   [] Right  [] Left  Positional Restrictions:no positional restrictions    Pre-Admission Information   Lives With: son    Type of Home: house  Home Layout: one level  Home Access: . Comment: uncertain pt with cognitive deficits, uncertain of accuracy  Comment: pt came from [facility] after a month long hospitalization at [facility]. Pt had only been there 3 days before she fell and was admitted here. Pt has no recollection of the [facility], thought she was at  this whole time. Prior to her hospitalization at [facility] pt was living at home with son. Active :        [] Yes  [x] No  Hand Dominance: [] Left  [x] Right  Current Employment: . Comment: pt likely retired  Hobbies:   Recent Falls: Pt has had multiple falls in last few months, unable to state how many.     Examination   Vision:   Patient Reported Deficits: other - legally blind  Hearing:   hard of hearing  Observation:   General Observation:  pt with IV, tele unit, carlson catheter  Posture:   Pt is extremely kyphotic, forward flexed at hips, forward head, rounded shoulders, decreased lumbar lordosis  Sensation:   WFL  Proprioception:    Unable to formally test secondary to pt cog    ROM:   (B) LE AROM WFL  Strength:   (B) LE strength grossly [facility]  Decision Making: low complexity  Clinical Presentation: stable      Subjective  General: Pt agreeable to PT/OT eval. Pleasantly confused  Pain: 6/10. Location: tailbone  Pain Interventions: repositioned        Functional Mobility  Bed Mobility  Supine to Sit: minimal assistance  Comments: to get trunk fully upright using bedrail with HOB elevated  Transfers  Sit to stand transfer: minimal assistance, moderate assistance  Stand to sit transfer: moderate assistance  Comments: min to stand from bed, moderate assist to stand from lower surfaces like toilet. Moderate assist to control descend to toilet, min to for recliner  Ambulation  Surface:level surface  Assistive Device: rolling walker  Other Appliance: supplemental O2  Assistance: contact guard assistance, minimal assistance  Distance: 15'x2  Gait Mechanics: pt with forward flexed trunk but slight posterior lean at times, varies between CGA for straightaways and min assist for turns. needs assist steer RW around obstacles. Shuffling steps  Comments:  OT managing O2 and IV lines during ambulation  Stair Mobility  Stair mobility not completed on this date. Comments:  Wheelchair Mobility:  No w/c mobility completed on this date. Comments:  Balance  Static Sitting Balance: fair (+): maintains balance at SBA/supervision without use of UE support  Dynamic Sitting Balance: fair (+): maintains balance at SBA/supervision without use of UE support  Static Standing Balance: fair (-): maintains balance at min with use of UE support  Dynamic Standing Balance: poor (+): requires min (A) to maintain balance  Comments: Pt stood for 2 min for pericare and pants management after a BM with min assist for posterior lean at RW.  At sink pt standing for about 5 min for hand hygiene and oral care, needs CGA to min assist for balance while standing due to occasional posterior lean    Other Therapeutic Interventions    Functional Outcomes  AM-PAC Inpatient Mobility Raw Score : 15 Cognition  Overall Cognitive Status: Impaired  Orientation:    oriented to person and oriented to time  Command Following:   accurately follows one step commands    Education  Barriers To Learning: cognition  Patient Education: patient educated on goals, PT role and benefits, plan of care, general safety, functional mobility training, orientation, transfer training, discharge recommendations  Learning Assessment:  patient will require reinforcement due to cognitive deficits    Assessment  Activity Tolerance: Pt tolerated session well, vitals remained stable, no excessive fatigue noted  Impairments Requiring Therapeutic Intervention: decreased functional mobility, decreased ADL status, decreased strength, decreased safety awareness, decreased cognition, decreased balance, decreased vision, decreased IADL, decreased posture  Prognosis: fair  Clinical Assessment: Pt presents to [facility] s/p fall at SNF. Pt is a poor historian due to some memory and cognition issues. Pt presents with deficits in her gait and mobility that are below her baseline putting her at an increased risk for falls. Pt would benefit from skilled therapy to maximize her safety with all mobility and facilitate independence. At this time recommending pt have 24/7 supv and further therapy at an inpatient facility.    Safety Interventions: patient left in chair, chair alarm in place, call light within reach, patient at risk for falls, and nurse notified    Plan  Frequency: 3-5 x/per week  Current Treatment Recommendations: strengthening, balance training, functional mobility training, transfer training, gait training, stair training, endurance training, safety education, and positioning    Goals  Patient Goals: none stated   Short Term Goals:  Time Frame: upon dc  Patient will complete bed mobility at supervision   Patient will complete transfers at supervision   Patient will ambulate 50 ft with use of rolling walker at stand by assistance    Therapy Session Time      Individual Group Co-treatment   Time In     1959   Time Out     1031   Minutes     54     Timed Code Treatment Minutes:  39 Minutes  Total Treatment Minutes:  54       Electronically Signed By: Pavan Claros, [address] [place] [address]

## 2022-09-27 NOTE — CARE COORDINATION
Discharge Planning Assessment     discharge planner met with patient to discuss reason for admission, current living situation, and potential needs at the time of discharge. Pt had some confusion today. Called pt's osmelrBriseida. Demographics/Insurance verified:  Yes    Current type of dwelling:  Pt is normally independent at home. Came from the  SNF and will return there at discharge. Patient from ECF/ confirmed with:  Spoke to Concetta Drummond in admissions at the , [phone], who confirmed pt was SNF at their facility. They are able to accept back when ready. Living arrangements:  Currently at SNF    Level of function/Support:  Patient is legally blind. Patient was confused today. Normally is alert and oriented. Tentative discharge plan:  Back to  SNF. Concetta Drummond stated pt \"may or may not need a precert. \"  Stated pt's primary insurance is [employer]. She will discuss with her supervisor and find out if a precert is needed. Would like a COVID test before discharge if possible but it's not required. Transportation at the time of discharge:  [place].     Electronically signed by NILDA Ledezma, SALLIEW on 9/27/2022 at 12:07 PM

## 2022-09-28 PROBLEM — R33.9 URINARY RETENTION: Status: ACTIVE | Noted: 2022-09-28

## 2022-09-28 LAB
ORGANISM: ABNORMAL
ORGANISM: ABNORMAL
URINE CULTURE, ROUTINE: ABNORMAL
URINE CULTURE, ROUTINE: ABNORMAL

## 2022-09-28 PROCEDURE — 94640 AIRWAY INHALATION TREATMENT: CPT

## 2022-09-28 PROCEDURE — 6370000000 HC RX 637 (ALT 250 FOR IP): Performed by: STUDENT IN AN ORGANIZED HEALTH CARE EDUCATION/TRAINING PROGRAM

## 2022-09-28 PROCEDURE — 94761 N-INVAS EAR/PLS OXIMETRY MLT: CPT

## 2022-09-28 PROCEDURE — 2580000003 HC RX 258: Performed by: NURSE PRACTITIONER

## 2022-09-28 PROCEDURE — 6360000002 HC RX W HCPCS: Performed by: NURSE PRACTITIONER

## 2022-09-28 PROCEDURE — 6370000000 HC RX 637 (ALT 250 FOR IP): Performed by: INTERNAL MEDICINE

## 2022-09-28 PROCEDURE — 6360000002 HC RX W HCPCS: Performed by: INTERNAL MEDICINE

## 2022-09-28 PROCEDURE — 1200000000 HC SEMI PRIVATE

## 2022-09-28 RX ORDER — SULFAMETHOXAZOLE AND TRIMETHOPRIM 800; 160 MG/1; MG/1
1 TABLET ORAL EVERY 12 HOURS SCHEDULED
Status: DISCONTINUED | OUTPATIENT
Start: 2022-09-28 | End: 2022-09-30 | Stop reason: HOSPADM

## 2022-09-28 RX ORDER — OXYCODONE AND ACETAMINOPHEN 10; 325 MG/1; MG/1
1 TABLET ORAL EVERY 8 HOURS PRN
Status: DISCONTINUED | OUTPATIENT
Start: 2022-09-28 | End: 2022-09-30 | Stop reason: HOSPADM

## 2022-09-28 RX ADMIN — NIFEDIPINE 60 MG: 30 TABLET, EXTENDED RELEASE ORAL at 09:22

## 2022-09-28 RX ADMIN — HYDROCODONE BITARTRATE AND ACETAMINOPHEN 1 TABLET: 7.5; 325 TABLET ORAL at 04:37

## 2022-09-28 RX ADMIN — SULFAMETHOXAZOLE AND TRIMETHOPRIM 1 TABLET: 800; 160 TABLET ORAL at 20:50

## 2022-09-28 RX ADMIN — ENOXAPARIN SODIUM 40 MG: 100 INJECTION SUBCUTANEOUS at 09:23

## 2022-09-28 RX ADMIN — QUETIAPINE FUMARATE 12.5 MG: 25 TABLET ORAL at 09:22

## 2022-09-28 RX ADMIN — TROSPIUM CHLORIDE 20 MG: 20 TABLET, FILM COATED ORAL at 09:23

## 2022-09-28 RX ADMIN — OXYCODONE AND ACETAMINOPHEN 1 TABLET: 10; 325 TABLET ORAL at 15:41

## 2022-09-28 RX ADMIN — MELATONIN TAB 3 MG 6 MG: 3 TAB at 20:50

## 2022-09-28 RX ADMIN — TAMSULOSIN HYDROCHLORIDE 0.4 MG: 0.4 CAPSULE ORAL at 20:50

## 2022-09-28 RX ADMIN — Medication 10 ML: at 20:58

## 2022-09-28 RX ADMIN — HYDROCODONE BITARTRATE AND ACETAMINOPHEN 1 TABLET: 7.5; 325 TABLET ORAL at 09:22

## 2022-09-28 RX ADMIN — MICONAZOLE NITRATE: 20 OINTMENT TOPICAL at 09:51

## 2022-09-28 RX ADMIN — Medication 1000 MG: at 20:53

## 2022-09-28 RX ADMIN — STANDARDIZED SENNA CONCENTRATE AND DOCUSATE SODIUM 2 TABLET: 8.6; 5 TABLET ORAL at 20:50

## 2022-09-28 RX ADMIN — MICONAZOLE NITRATE: 20 OINTMENT TOPICAL at 20:58

## 2022-09-28 RX ADMIN — LEVOTHYROXINE SODIUM 112 MCG: 0.11 TABLET ORAL at 09:39

## 2022-09-28 RX ADMIN — QUETIAPINE FUMARATE 12.5 MG: 25 TABLET ORAL at 20:50

## 2022-09-28 RX ADMIN — Medication 10 ML: at 09:51

## 2022-09-28 RX ADMIN — POLYETHYLENE GLYCOL 3350 17 G: 17 POWDER, FOR SOLUTION ORAL at 09:23

## 2022-09-28 RX ADMIN — Medication 2 PUFF: at 21:22

## 2022-09-28 RX ADMIN — Medication 2 PUFF: at 13:02

## 2022-09-28 ASSESSMENT — PAIN DESCRIPTION - LOCATION
LOCATION: GENERALIZED
LOCATION: GENERALIZED

## 2022-09-28 ASSESSMENT — PAIN SCALES - GENERAL
PAINLEVEL_OUTOF10: 10
PAINLEVEL_OUTOF10: 10
PAINLEVEL_OUTOF10: 0

## 2022-09-28 NOTE — PROGRESS NOTES
Hospitalist Progress Note      Date of Admission: 9/26/2022    Hospital Course:   Sent to the emergency room after a fall at [facility]. Patient fell backwards landing on her tailbone and striking her head. There was no loss of consciousness      Subjective:  No acute events overnight. Patient is sleepy today. She has been receiving both scheduled oxycodone/apap 10/325 and prn hydrocodone 7.5/325. Medications:    Infusion Medications    sodium chloride         Scheduled Medications    sodium chloride flush  5-40 mL IntraVENous 2 times per day    levothyroxine  112 mcg Oral Daily    NIFEdipine  60 mg Oral Daily    QUEtiapine  12.5 mg Oral Nightly    sennosides-docusate sodium  2 tablet Oral Nightly    tamsulosin  0.4 mg Oral Nightly    miconazole nitrate   Topical BID    trospium  20 mg Oral Daily    albuterol sulfate HFA  2 puff Inhalation BID    enoxaparin  40 mg SubCUTAneous Daily    oxyCODONE-acetaminophen  1 tablet Oral Q12H    polyethylene glycol  17 g Oral Daily    cefTRIAXone (ROCEPHIN) IV  1,000 mg IntraVENous Q24H       Physical Exam Performed:  /66   Pulse 97   Temp 99.2 °F (37.3 °C) (Temporal)   Resp 18   Ht 5' 4\" (1.626 m)   Wt 127 lb 14.4 oz (58 kg)   SpO2 91%   BMI 21.95 kg/m²     Intake/Output Summary (Last 24 hours) at 9/28/2022 0951  Last data filed at 9/28/2022 0445  Gross per 24 hour   Intake 760 ml   Output 700 ml   Net 60 ml     Room air     GEN:               Appearance:  pleaseant elderly female in NAD sitting up in bedside chair  Level of Consciousness:  alert . Orientation:  self and \"[facility]\"     HEENT:           Sclera anicteric.  no conjunctival chemosis. moist mucus membranes. no specific or diagnostic oral lesions. NECK:             no signs of meningismus. Jugular veins not distended. Carotid pulses  2+.  no cervical lymphadenopathy. no thyromegaly. CV:                  regular rhythm.   normal S1 & S2.    no murmur. no rub.  no gallop. PULM:             Chest excursion is symmetric. Breath sounds are vesicular. Adventitious sounds:  none     AB:                  Abdominal shape is normal.  Bowel sounds are active. Generally soft to palpation. no tenderness is present. no involuntary guarding. no rebound guarding. RECTAL:           :                  Carty in situ. Cloudy orange-tinted urine. EXTR:              Skin is warm. Capillary refill brisk. no specific or pathognomic rash. no clubbing. no pitting edema. no active wound or ulcer. Pulses 2+ x 4    Labs:   Recent Labs     09/26/22 1827 09/27/22  0628   WBC 12.9* 8.5   HGB 13.1 11.4*   HCT 40.5 34.8*    198     Recent Labs     09/26/22 1827 09/27/22  0628    142   K 4.7 4.0    106   CO2 21 27   BUN 38* 34*   CREATININE 1.4* 1.0   CALCIUM 9.8 9.2     Recent Labs     09/26/22 1827 09/27/22  0628   AST 24 15   ALT 19 14   BILITOT 0.4 0.3   ALKPHOS 116 98     No results for input(s): INR in the last 72 hours. Recent Labs     09/26/22 1827 09/26/22 2039 09/27/22  0628   CKTOTAL 91  --   --    TROPONINI 0.03* 0.03* 0.04*       Urinalysis:      Lab Results   Component Value Date/Time    NITRU POSITIVE 09/26/2022 06:29 PM    WBCUA 658 09/26/2022 06:29 PM    BACTERIA 3+ 09/26/2022 06:29 PM    RBCUA 21 09/26/2022 06:29 PM    BLOODU MODERATE 09/26/2022 06:29 PM    SPECGRAV 1.020 09/26/2022 06:29 PM    GLUCOSEU Negative 09/26/2022 06:29 PM       Radiology:  XR CHEST PORTABLE   Final Result   No acute process. CT HEAD WO CONTRAST   Final Result   Head CT: No acute intracranial abnormality detected. Cervical spine CT: No acute fracture or traumatic malalignment. CT CERVICAL SPINE WO CONTRAST   Final Result   Head CT: No acute intracranial abnormality detected. Cervical spine CT: No acute fracture or traumatic malalignment.          CT ABDOMEN PELVIS WO CONTRAST Additional Contrast? None Final Result   Urinary bladder mural thickening with mild surrounding fat stranding,   suggesting cystitis. Carty catheter is in place. Otherwise, no acute abnormality identified. Moderate diverticulosis of the large bowel, but without CT evidence of   diverticulitis. Fibroid uterus. Active Hospital Problems    Diagnosis     Urinary retention [R33.9]      Priority: Medium    HTN (hypertension) [I10]      Priority: Medium    Hypothyroidism [E03.9]      Priority: Medium    Generalized weakness [R53.1]      Priority: Medium    UTI (urinary tract infection) [N39.0]      Priority: Medium       Assessment/Plan:  UTI, Chronic Urinary Retention  -  Empiric ceftriaxone 1g q24h started 9/26.  -  Urine Cx 9/26:  Citrobacter freundii > 100k CFU/mL and Klebsiella oxytoca 50k CFU/mL. Sensitivities pending.  -  Carty catheter is chronic. It was replaced on 9/26. Tailbone pain  -  Over sedated. Change analgesic regimen to oxycodone/APAP 10/325 q8h prn. No scheduled opioids.    -  Continue daily miralax and senna-docusate as well. HTN  -  Continue home nifedipine. Hypothyroidism  -  Continue home levothyroxine 112 mcg daily. DVT Prophylaxis: SCDs, lovenox  Diet: ADULT DIET; Dysphagia - Soft and Bite Sized  Code Status: Full Code     PT/OT Eval Status: Scored a 15/24 on the AM-PAC short mobility form. It is recommended that the patient have 3-5 sessions per week       Dispo - Return to the [facility] when ready, likely  9/29 depending on micro data and antibiotic choices.     Ben Antonio MD

## 2022-09-29 PROCEDURE — 97530 THERAPEUTIC ACTIVITIES: CPT

## 2022-09-29 PROCEDURE — 6360000002 HC RX W HCPCS: Performed by: NURSE PRACTITIONER

## 2022-09-29 PROCEDURE — 2700000000 HC OXYGEN THERAPY PER DAY

## 2022-09-29 PROCEDURE — 2580000003 HC RX 258: Performed by: NURSE PRACTITIONER

## 2022-09-29 PROCEDURE — 6370000000 HC RX 637 (ALT 250 FOR IP): Performed by: INTERNAL MEDICINE

## 2022-09-29 PROCEDURE — 1200000000 HC SEMI PRIVATE

## 2022-09-29 PROCEDURE — 94640 AIRWAY INHALATION TREATMENT: CPT

## 2022-09-29 PROCEDURE — 6370000000 HC RX 637 (ALT 250 FOR IP): Performed by: STUDENT IN AN ORGANIZED HEALTH CARE EDUCATION/TRAINING PROGRAM

## 2022-09-29 PROCEDURE — 94761 N-INVAS EAR/PLS OXIMETRY MLT: CPT

## 2022-09-29 PROCEDURE — 6360000002 HC RX W HCPCS: Performed by: INTERNAL MEDICINE

## 2022-09-29 PROCEDURE — 97535 SELF CARE MNGMENT TRAINING: CPT

## 2022-09-29 RX ORDER — SULFAMETHOXAZOLE AND TRIMETHOPRIM 800; 160 MG/1; MG/1
1 TABLET ORAL EVERY 12 HOURS SCHEDULED
Qty: 18 TABLET | Refills: 0 | Status: SHIPPED | OUTPATIENT
Start: 2022-09-30 | End: 2022-10-09

## 2022-09-29 RX ORDER — ALBUTEROL SULFATE 90 UG/1
2 AEROSOL, METERED RESPIRATORY (INHALATION) EVERY 4 HOURS PRN
Status: DISCONTINUED | OUTPATIENT
Start: 2022-09-29 | End: 2022-09-30 | Stop reason: HOSPADM

## 2022-09-29 RX ADMIN — POLYETHYLENE GLYCOL 3350 17 G: 17 POWDER, FOR SOLUTION ORAL at 08:08

## 2022-09-29 RX ADMIN — Medication 1000 MG: at 19:50

## 2022-09-29 RX ADMIN — SULFAMETHOXAZOLE AND TRIMETHOPRIM 1 TABLET: 800; 160 TABLET ORAL at 19:51

## 2022-09-29 RX ADMIN — Medication 2 PUFF: at 09:33

## 2022-09-29 RX ADMIN — OXYCODONE AND ACETAMINOPHEN 1 TABLET: 10; 325 TABLET ORAL at 11:00

## 2022-09-29 RX ADMIN — MELATONIN TAB 3 MG 6 MG: 3 TAB at 19:50

## 2022-09-29 RX ADMIN — MICONAZOLE NITRATE: 20 OINTMENT TOPICAL at 09:27

## 2022-09-29 RX ADMIN — Medication 10 ML: at 08:09

## 2022-09-29 RX ADMIN — ENOXAPARIN SODIUM 40 MG: 100 INJECTION SUBCUTANEOUS at 08:08

## 2022-09-29 RX ADMIN — LEVOTHYROXINE SODIUM 112 MCG: 0.11 TABLET ORAL at 06:38

## 2022-09-29 RX ADMIN — Medication 10 ML: at 19:51

## 2022-09-29 RX ADMIN — STANDARDIZED SENNA CONCENTRATE AND DOCUSATE SODIUM 2 TABLET: 8.6; 5 TABLET ORAL at 19:51

## 2022-09-29 RX ADMIN — QUETIAPINE FUMARATE 12.5 MG: 25 TABLET ORAL at 19:50

## 2022-09-29 RX ADMIN — NIFEDIPINE 60 MG: 30 TABLET, EXTENDED RELEASE ORAL at 08:08

## 2022-09-29 RX ADMIN — MICONAZOLE NITRATE: 20 OINTMENT TOPICAL at 20:20

## 2022-09-29 RX ADMIN — QUETIAPINE FUMARATE 12.5 MG: 25 TABLET ORAL at 11:00

## 2022-09-29 RX ADMIN — TAMSULOSIN HYDROCHLORIDE 0.4 MG: 0.4 CAPSULE ORAL at 19:50

## 2022-09-29 RX ADMIN — SULFAMETHOXAZOLE AND TRIMETHOPRIM 1 TABLET: 800; 160 TABLET ORAL at 08:08

## 2022-09-29 RX ADMIN — TROSPIUM CHLORIDE 20 MG: 20 TABLET, FILM COATED ORAL at 08:08

## 2022-09-29 ASSESSMENT — PAIN SCALES - GENERAL
PAINLEVEL_OUTOF10: 0
PAINLEVEL_OUTOF10: 0
PAINLEVEL_OUTOF10: 5
PAINLEVEL_OUTOF10: 0
PAINLEVEL_OUTOF10: 8
PAINLEVEL_OUTOF10: 6

## 2022-09-29 NOTE — PROGRESS NOTES
Hospitalist Progress Note      Date of Admission: 9/26/2022    Hospital Course:   Sent to the emergency room after a fall at [facility]. Patient fell backwards landing on her tailbone and striking her head. There was no loss of consciousness       Subjective:  Much more alert today but confused. Not making much sense. Medications:    Infusion Medications    sodium chloride         Scheduled Medications    sulfamethoxazole-trimethoprim  1 tablet Oral 2 times per day    sodium chloride flush  5-40 mL IntraVENous 2 times per day    levothyroxine  112 mcg Oral Daily    NIFEdipine  60 mg Oral Daily    QUEtiapine  12.5 mg Oral Nightly    sennosides-docusate sodium  2 tablet Oral Nightly    tamsulosin  0.4 mg Oral Nightly    miconazole nitrate   Topical BID    trospium  20 mg Oral Daily    albuterol sulfate HFA  2 puff Inhalation BID    enoxaparin  40 mg SubCUTAneous Daily    polyethylene glycol  17 g Oral Daily    cefTRIAXone (ROCEPHIN) IV  1,000 mg IntraVENous Q24H       Physical Exam Performed:  BP (!) 118/56   Pulse 95   Temp 98.2 °F (36.8 °C) (Temporal)   Resp 16   Ht 5' 4\" (1.626 m)   Wt 128 lb 12 oz (58.4 kg)   SpO2 96%   BMI 22.10 kg/m²     Intake/Output Summary (Last 24 hours) at 9/29/2022 1454  Last data filed at 9/29/2022 1341  Gross per 24 hour   Intake --   Output 500 ml   Net -500 ml     Room air     GEN:               Appearance:  pleaseant elderly female in NAD. She is in bed, gown lying beside her, and whispering to herself. Level of Consciousness:  alert . Orientation:  still oriented to self and \"[facility]\"     HEENT:           Sclera anicteric.  no conjunctival chemosis. moist mucus membranes. no specific or diagnostic oral lesions. NECK:             no signs of meningismus. Jugular veins not distended. Carotid pulses  2+.  no cervical lymphadenopathy. no thyromegaly. CV:                  regular rhythm. normal S1 & S2.    no murmur. no rub.  no gallop. PULM:             Chest excursion is symmetric. Breath sounds are vesicular. Adventitious sounds:  none     AB:                  Abdominal shape is normal.  Bowel sounds are active. Generally soft to palpation. no tenderness is present. no involuntary guarding. no rebound guarding. RECTAL:           :                  Carty in situ. EXTR:              Skin is warm. Capillary refill brisk. no specific or pathognomic rash. no clubbing. no pitting edema. no active wound or ulcer. Pulses 2+ x 4  Labs:   Recent Labs     09/26/22 1827 09/27/22  0628   WBC 12.9* 8.5   HGB 13.1 11.4*   HCT 40.5 34.8*    198     Recent Labs     09/26/22 1827 09/27/22  0628    142   K 4.7 4.0    106   CO2 21 27   BUN 38* 34*   CREATININE 1.4* 1.0   CALCIUM 9.8 9.2     Recent Labs     09/26/22 1827 09/27/22  0628   AST 24 15   ALT 19 14   BILITOT 0.4 0.3   ALKPHOS 116 98     No results for input(s): INR in the last 72 hours. Recent Labs     09/26/22 1827 09/26/22 2039 09/27/22  0628   CKTOTAL 91  --   --    TROPONINI 0.03* 0.03* 0.04*       Urinalysis:      Lab Results   Component Value Date/Time    NITRU POSITIVE 09/26/2022 06:29 PM    WBCUA 658 09/26/2022 06:29 PM    BACTERIA 3+ 09/26/2022 06:29 PM    RBCUA 21 09/26/2022 06:29 PM    BLOODU MODERATE 09/26/2022 06:29 PM    SPECGRAV 1.020 09/26/2022 06:29 PM    GLUCOSEU Negative 09/26/2022 06:29 PM       Radiology:  XR CHEST PORTABLE   Final Result   No acute process. CT HEAD WO CONTRAST   Final Result   Head CT: No acute intracranial abnormality detected. Cervical spine CT: No acute fracture or traumatic malalignment. CT CERVICAL SPINE WO CONTRAST   Final Result   Head CT: No acute intracranial abnormality detected. Cervical spine CT: No acute fracture or traumatic malalignment.          CT ABDOMEN PELVIS WO CONTRAST Additional Contrast? None   Final Result   Urinary bladder mural thickening with mild surrounding fat stranding,   suggesting cystitis. Carty catheter is in place. Otherwise, no acute abnormality identified. Moderate diverticulosis of the large bowel, but without CT evidence of   diverticulitis. Fibroid uterus. Active Hospital Problems    Diagnosis     Urinary retention [R33.9]      Priority: Medium    HTN (hypertension) [I10]      Priority: Medium    Hypothyroidism [E03.9]      Priority: Medium    Generalized weakness [R53.1]      Priority: Medium    UTI (urinary tract infection) [N39.0]      Priority: Medium       Assessment/Plan:  UTI, Chronic Urinary Retention  -  Empiric ceftriaxone 1g q24h started 9/26.  -  Urine Cx 9/26:  Citrobacter freundii > 100k CFU/mL and Klebsiella oxytoca 50k CFU/mL. Sensitive to multiple oral abx. Will treat with bactrim DS BID x 10 days. Continue IV ceftriaxone as long as patient is in the hospital.  -  Carty catheter is chronic. It was replaced on 9/26. Tailbone pain  -  Over sedated. Change analgesic regimen to oxycodone/APAP 10/325 q8h prn. No scheduled opioids.    -  Continue daily miralax and senna-docusate as well. HTN  -  Continue home nifedipine. Hypothyroidism  -  Continue home levothyroxine 112 mcg daily. DVT Prophylaxis: SCDs, lovenox  Diet: ADULT DIET; Dysphagia - Soft and Bite Sized  Code Status: Full Code     PT/OT Eval Status: Scored a 15/24 on the AM-PAC short mobility form.   It is recommended that the patient have 3-5 sessions per week       Dispo - D/C to [facility] today    Frederick Mccormack MD

## 2022-09-29 NOTE — CARE COORDINATION
Discharge Planning:     (ANNABELLE) called and spoke with  admissions staff, Jaiden Solomon ([phone]), and informed of patient's discharge. Jaiden Solomon reported that she was unsure if patient will need a pre-cert or not and agreed to confirm this information and update CM so that discharge can be scheduled. CM awaits callback. BRITNEY Belcher [facility] -   [phone]    Electronically signed by NILDA Shepherd on 9/29/2022 at 3:31 PM      Update at [phone]:  CM spoke with  staff, Jaiden Solomon, who reported that she will start the patient's pre-cert now. CM called patient's Daughter, Mary Olmos (on emergency contact list), who agreed to above information and to possible discharge tomorrow for patient to go to  skilled nursing facility (SNF). CM awaits confirmation from  staff on pre-cert.       BRITNEY Belcher [facility] -   [phone]    Electronically signed by NILDA Shepherd on 9/29/2022 at 3:59 PM

## 2022-09-29 NOTE — DISCHARGE SUMMARY
Hospital Medicine Discharge Summary    Patient ID: Yaneth Gastelum      Patient's PCP: No primary care provider on file. Admit Date: 9/26/2022     Discharge Date:   9/30/2022    Admitting Provider: Ben Smith DO     Discharge Provider: Alta Cano MD     Discharge Diagnoses: Active Hospital Problems    Diagnosis     Urinary retention [R33.9]      Priority: Medium    HTN (hypertension) [I10]      Priority: Medium    Hypothyroidism [E03.9]      Priority: Medium    Generalized weakness [R53.1]      Priority: Medium    UTI (urinary tract infection) [N39.0]      Priority: Medium       The patient was seen and examined on day of discharge and this discharge summary is in conjunction with any daily progress note from day of discharge. Hospital Course:  Sent to the emergency room after a fall at [facility]. Patient fell backwards landing on her tailbone and striking her head. There was no loss of consciousness. Patient was diagnosed with a UTI and treated accordingly. She was intermittently confused but remained oriented to self and place. She was also generally weak, assessed by PT/OT, and found to be in need of 3-5 sessions of therapy per week. Course by problem. ..    UTI, Chronic Urinary Retention  -  Empiric ceftriaxone 1g q24h started 9/26.  -  Urine Cx 9/26:  Citrobacter freundii > 100k CFU/mL and Klebsiella oxytoca 50k CFU/mL. Sensitive to multiple oral abx. Will treat with bactrim DS BID x 10 days. Continue IV ceftriaxone as long as patient is in the hospital.  -  Carty catheter is chronic. It was replaced on 9/26. Tailbone pain  -  Over sedated. Change analgesic regimen to oxycodone/APAP 10/325 q8h prn. No scheduled opioids.    -  Continue daily miralax and senna-docusate as well. HTN  -  Continue home nifedipine. Hypothyroidism  -  Continue home levothyroxine 112 mcg daily.     Physical Exam Performed:     /65   Pulse 100   Temp 97.7 °F (36.5 °C) (Temporal)   Resp 18   Ht 5' 4\" (1.626 m)   Wt 128 lb 4.9 oz (58.2 kg)   SpO2 93%   BMI 22.02 kg/m²        GEN:               Appearance:  pleaseant elderly female in NAD. She is in bed, gown lying beside her, and whispering to herself. Level of Consciousness:  alert . Orientation:  still oriented to self and \"[facility]\"     HEENT:           Sclera anicteric.  no conjunctival chemosis. moist mucus membranes. no specific or diagnostic oral lesions. NECK:             no signs of meningismus. Jugular veins not distended. Carotid pulses  2+.  no cervical lymphadenopathy. no thyromegaly. CV:                  regular rhythm. normal S1 & S2.    no murmur. no rub.  no gallop. PULM:             Chest excursion is symmetric. Breath sounds are vesicular. Adventitious sounds:  none     AB:                  Abdominal shape is normal.  Bowel sounds are active. Generally soft to palpation. no tenderness is present. no involuntary guarding. no rebound guarding. RECTAL:           :                  Carty in situ. EXTR:              Skin is warm. Capillary refill brisk. no specific or pathognomic rash. no clubbing. no pitting edema. no active wound or ulcer. Pulses 2+ x 4    Labs: For convenience and continuity at follow-up the following most recent labs are provided:    CBC:    Lab Results   Component Value Date/Time    WBC 8.5 09/27/2022 06:28 AM    HGB 11.4 09/27/2022 06:28 AM    HCT 34.8 09/27/2022 06:28 AM     09/27/2022 06:28 AM       Renal:    Lab Results   Component Value Date/Time     09/27/2022 06:28 AM    K 4.0 09/27/2022 06:28 AM     09/27/2022 06:28 AM    CO2 27 09/27/2022 06:28 AM    BUN 34 09/27/2022 06:28 AM    CREATININE 1.0 09/27/2022 06:28 AM    CALCIUM 9.2 09/27/2022 06:28 AM         Significant Diagnostic Studies    Radiology:   XR CHEST PORTABLE   Final Result   No acute process.          CT HEAD WO CONTRAST   Final Result   Head CT: No acute intracranial abnormality detected. Cervical spine CT: No acute fracture or traumatic malalignment. CT CERVICAL SPINE WO CONTRAST   Final Result   Head CT: No acute intracranial abnormality detected. Cervical spine CT: No acute fracture or traumatic malalignment. CT ABDOMEN PELVIS WO CONTRAST Additional Contrast? None   Final Result   Urinary bladder mural thickening with mild surrounding fat stranding,   suggesting cystitis. Carty catheter is in place. Otherwise, no acute abnormality identified. Moderate diverticulosis of the large bowel, but without CT evidence of   diverticulitis. Fibroid uterus. Consults:     None    Disposition:  SNF     Condition at Discharge: Stable    Discharge Instructions/Follow-up:  SNF    Code Status:  Full Code     Activity: activity with assistance per PT/OT    Diet: regular diet      Discharge Medications:     Current Discharge Medication List             Details   sulfamethoxazole-trimethoprim (BACTRIM DS;SEPTRA DS) 800-160 MG per tablet Take 1 tablet by mouth every 12 hours for 9 days  Qty: 18 tablet, Refills: 0                Details   levothyroxine (SYNTHROID) 112 MCG tablet Take 112 mcg by mouth Daily      melatonin 3 MG TABS tablet Take 6 mg by mouth nightly as needed      polyethylene glycol (GLYCOLAX) 17 g packet Take 17 g by mouth daily as needed for Constipation      !! QUEtiapine (SEROQUEL) 25 MG tablet Take 25 mg by mouth nightly      !! QUEtiapine (SEROQUEL) 25 MG tablet Take 12.5 mg by mouth every 6 hours as needed for Agitation      tamsulosin (FLOMAX) 0.4 MG capsule Take 0.4 mg by mouth nightly      vitamin E 400 UNIT capsule Take 400 Units by mouth daily      oxyCODONE-acetaminophen (PERCOCET)  MG per tablet Take 1 tablet by mouth in the morning and 1 tablet in the evening.       miconazole nitrate 2 % OINT Apply topically 2 times daily      albuterol sulfate HFA (VENTOLIN HFA) 108 (90 Base) MCG/ACT inhaler Inhale 2 puffs into the lungs as needed for Wheezing      solifenacin (VESICARE) 5 MG tablet Take 5 mg by mouth daily      NIFEdipine (PROCARDIA XL) 60 MG extended release tablet Take 60 mg by mouth daily       ! ! - Potential duplicate medications found. Please discuss with provider. Time Spent on discharge is more than 20 minutes in the examination, evaluation, counseling and review of medications and discharge plan. Signed:    Alta Cano MD   9/30/2022      Thank you No primary care provider on file. for the opportunity to be involved in this patient's care. If you have any questions or concerns, please feel free to contact me at [phone].

## 2022-09-29 NOTE — RT PROTOCOL NOTE
RT Inhaler-Nebulizer Bronchodilator Protocol Note    There is a bronchodilator order in the chart from a provider indicating to follow the RT Bronchodilator Protocol and there is an Initiate RT Inhaler-Nebulizer Bronchodilator Protocol order as well (see protocol at bottom of note). CXR Findings:  No results found. The findings from the last RT Protocol Assessment were as follows:   History Pulmonary Disease: Chronic pulmonary disease  Respiratory Pattern: Regular pattern and RR 12-20 bpm  Breath Sounds: Clear breath sounds  Cough: Strong, spontaneous, non-productive  Indication for Bronchodilator Therapy: Decreased or absent breath sounds, On home bronchodilators  Bronchodilator Assessment Score: 2    Aerosolized bronchodilator medication orders have been revised according to the RT Inhaler-Nebulizer Bronchodilator Protocol below. Respiratory Therapist to perform RT Therapy Protocol Assessment initially then follow the protocol. Repeat RT Therapy Protocol Assessment PRN for score 0-3 or on second treatment, BID, and PRN for scores above 3. No Indications - adjust the frequency to every 6 hours PRN wheezing or bronchospasm, if no treatments needed after 48 hours then discontinue using Per Protocol order mode. If indication present, adjust the RT bronchodilator orders based on the Bronchodilator Assessment Score as indicated below. Use Inhaler orders unless patient has one or more of the following: on home nebulizer, not able to hold breath for 10 seconds, is not alert and oriented, cannot activate and use MDI correctly, or respiratory rate 25 breaths per minute or more, then use the equivalent nebulizer order(s) with same Frequency and PRN reasons based on the score. If a patient is on this medication at home then do not decrease Frequency below that used at home.     0-3 - enter or revise RT bronchodilator order(s) to equivalent RT Bronchodilator order with Frequency of every 4 hours PRN for wheezing or increased work of breathing using Per Protocol order mode. 4-6 - enter or revise RT Bronchodilator order(s) to two equivalent RT bronchodilator orders with one order with BID Frequency and one order with Frequency of every 4 hours PRN wheezing or increased work of breathing using Per Protocol order mode. 7-10 - enter or revise RT Bronchodilator order(s) to two equivalent RT bronchodilator orders with one order with TID Frequency and one order with Frequency of every 4 hours PRN wheezing or increased work of breathing using Per Protocol order mode. 11-13 - enter or revise RT Bronchodilator order(s) to one equivalent RT bronchodilator order with QID Frequency and an Albuterol order with Frequency of every 4 hours PRN wheezing or increased work of breathing using Per Protocol order mode. Greater than 13 - enter or revise RT Bronchodilator order(s) to one equivalent RT bronchodilator order with every 4 hours Frequency and an Albuterol order with Frequency of every 2 hours PRN wheezing or increased work of breathing using Per Protocol order mode. RT to enter RT Home Evaluation for COPD & MDI Assessment order using Per Protocol order mode.     Electronically signed by Rich Briceno RCP on 9/29/2022 at 7:48 PM

## 2022-09-29 NOTE — PROGRESS NOTES
[facility]   Phone: [phone]    Physical Therapy    [] Initial Evaluation            [x] Daily Treatment Note         [] Discharge Summary      Patient: Cecillia Habermann   : 1935   MRN: [id number]   Date of Service:  2022  Admitting Diagnosis: UTI (urinary tract infection)  Current Admission Summary: Cecillia Habermann is a 81 y.o. female who presents emergency department today for evaluation for a fall which occurred shortly before arriving to the ED. The patient is from a [facility]. Apparently the patient fell backwards while trying to get out of bed, and hit her head. There is no loss of consciousness. No vomiting. There is report of another fall possibly tripping over a catheter. The patient arrives to the ED she is alert and oriented x2, does seem to have some transient confusion. Her vital signs are stable. The patient tells me that she has a Carty catheter in place and has this in place for 3 days although she is unsure why. The patient has no headaches. No visual changes. No numbness, tilling or weakness. She has no chest pain pain or shortness of breath. She has no fever or chills. No cough or congestion. No vomiting or diarrhea. She denies any urinary symptoms. She is not on any blood thinners, no other complaints at this time. Past Medical History:  has a past medical history of COPD (chronic obstructive pulmonary disease) ([address] Dementia ([facility] [address] Diverticulosis, Glaucoma, Hypertension, Hypothyroid, and Legal blindness. Past Surgical History:  has a past surgical history that includes eye surgery; Tonsillectomy; and joint replacement. Discharge Recommendations: Cecillia Habermann scored a 9/24 on the AM-PAC short mobility form. Current research shows that an AM-PAC score of 17 or less is typically not associated with a discharge to the patient's home setting.  Based on the patient's AM-PAC score and their current functional mobility deficits, it is recommended that the patient have 3-5 sessions per week of Physical Therapy at d/c to increase the patient's independence. Please see assessment section for further patient specific details. If patient discharges prior to next session this note will serve as a discharge summary. Please see below for the latest assessment towards goals. DME Required For Discharge: DME to be determined at next level of care  Precautions/Restrictions: high fall risk  Weight Bearing Restrictions: no restrictions  [] Right Upper Extremity  [] Left Upper Extremity [] Right Lower Extremity  [] Left Lower Extremity     Required Braces/Orthotics: no braces required   [] Right  [] Left  Positional Restrictions:no positional restrictions    Pre-Admission Information   Lives With: son    Type of Home: house  Home Layout: one level  Home Access: . Comment: uncertain pt with cognitive deficits, uncertain of accuracy  Comment: pt came from [facility] after a month long hospitalization at [facility]. Pt had only been there 3 days before she fell and was admitted here. Pt has no recollection of the [facility], thought she was at  this whole time. Prior to her hospitalization at [facility] pt was living at home with son. Active :        [] Yes  [x] No  Hand Dominance: [] Left  [x] Right  Current Employment: . Comment: pt likely retired  Hobbies:   Recent Falls: Pt has had multiple falls in last few months, unable to state how many. Subjective  General: Pt agreeable to PT tx. Pt very confused this PM. Pt was lying crooked in bed, gown down around her waist and her leg pulling on her catheter to the point that it had taken off the sticker from her leg. Pain: 6/10.   Location: tailbone  Pain Interventions: repositioned        Functional Mobility  Bed Mobility  Supine to Sit: moderate assistance  Sit to Supine: dependent assistance  Comments: Pt needing moderate assist to get fully upright to EOB. Max x2 to get lying down as pt began to get anxious about falling out the window and the RN had to assist with getting her trunk while PT got her legs onto bed. Transfers  Sit to stand transfer: maximum assistance  Stand to sit transfer: maximum assistance  Comments: Max assist due to significant posterior lean   Ambulation  Ambulation not tested on this date secondary to pt cognition and posterior lean worse this date. Stair Mobility  Stair mobility not completed on this date. Comments:  Wheelchair Mobility:  No w/c mobility completed on this date. Comments:  Balance  Static Sitting Balance: fair (+): maintains balance at SBA/supervision without use of UE support  Dynamic Sitting Balance: fair (-): maintains balance at CGA with use of UE support  Static Standing Balance: poor: requires mod (A) to maintain balance  Dynamic Standing Balance: poor (-): requires max (A) to maintain balance  Comments: Pt standing with moderate assist to correct for posterior lean, tried to get her to sidestep but pt refusing to step, scared she would fall out the windows which were 10 feet away.     Other Therapeutic Interventions    Functional Outcomes  AM-PAC Inpatient Mobility Raw Score : 9              Cognition  Overall Cognitive Status: Impaired  Orientation:    oriented to person and oriented to time  Command Following:   accurately follows one step commands    Education  Barriers To Learning: cognition  Patient Education: patient educated on goals, PT role and benefits, plan of care, general safety, functional mobility training, orientation, transfer training, discharge recommendations  Learning Assessment:  patient will require reinforcement due to cognitive deficits    Assessment  Activity Tolerance: Pt tolerated session well, vitals remained stable, no excessive fatigue noted  Impairments Requiring Therapeutic Intervention: decreased functional mobility, decreased ADL status, decreased strength, decreased safety awareness, decreased cognition, decreased balance, decreased vision, decreased IADL, decreased posture  Prognosis: fair  Clinical Assessment: Pt with decline in mobility this PM. Very confused and not noticing that she had been pulling on her catheter or that she was half naked upon therapist entry. Pt struggling to follow commands, could not correct posterior lean or try to step. Got pt resettled in bed with RN help and left with RN in room.    Safety Interventions: patient left in bed, bed alarm in place, call light within reach, patient at risk for falls, and nurse notified    Plan  Frequency: 3-5 x/per week  Current Treatment Recommendations: strengthening, balance training, functional mobility training, transfer training, gait training, stair training, endurance training, safety education, and positioning    Goals  Patient Goals: none stated   Short Term Goals:  Time Frame: upon dc  Patient will complete bed mobility at supervision   Patient will complete transfers at supervision   Patient will ambulate 50 ft with use of rolling walker at stand by assistance  No goals met this date  Therapy Session Time      Individual Group Co-treatment   Time In 1505       Time Out 1515       Minutes 10         Timed Code Treatment Minutes:  10  Total Treatment Minutes:10       Electronically Signed By: Trena Landon, [address] [place] [address]

## 2022-09-29 NOTE — PROGRESS NOTES
Physician Progress Note      Hayden Boyd  [facility] #:                  [id number]  :                       1935  ADMIT DATE:       2022 6:06 PM  [address] [place] DATE:  RESPONDING  PROVIDER #:        Dilshad Rehman MD          QUERY TEXT:    Pt admitted with UTI and has encephalopathy documented. If possible, please   document in progress notes and discharge summary further specificity regarding   the type of encephalopathy:    The medical record reflects the following:  Risk Factors: UTI, dementia  Clinical Indicators: H&P note documented \"Acute Encephalopathy; CT head no   acute intercranial abnormalities; Unclear her baseline mental status; Hx   dementia; Secondary to UTI. \"  Treatment: cultures, IV antibiotics  Options provided:  -- Metabolic encephalopathy  -- Encephalopathy ruled out; confusion due to dementia only  -- Other - I will add my own diagnosis  -- Disagree - Not applicable / Not valid  -- Disagree - Clinically unable to determine / Unknown  -- Refer to Clinical Documentation Reviewer    PROVIDER RESPONSE TEXT:    This patient has metabolic encephalopathy.     Query created by: Aditya Braswell on 2022 12:53 PM      Electronically signed by:  Dilshad Rehman MD 2022 11:35 PM

## 2022-09-29 NOTE — DISCHARGE INSTR - COC
Continuity of Care Form    Patient Name: Brigid Baxter   :  1935  MRN:  [id number]    Admit date:  2022  Discharge date:  2022    Code Status Order: Full Code   Advance Directives:     Admitting Physician:  Patricia Wilkes DO  PCP: No primary care provider on file.     Discharging Nurse: [facility] Unit/Room#: P0X-5594/3990-88  Discharging Unit Phone Number: [phone]    Emergency Contact:   Extended Emergency Contact Information  Primary Emergency Contact: [facility]  Home Phone: [phone]  Relation: Child  Secondary Emergency Contact: eulogio ramirez  Home Phone: [phone]  Relation: Child    Past Surgical History:  Past Surgical History:   Procedure Laterality Date    EYE SURGERY      JOINT REPLACEMENT      TONSILLECTOMY         Immunization History:   Immunization History   Administered Date(s) Administered    COVID-19, PFIZER PURPLE top, DILUTE for use, (age 15 y+), 30mcg/0.3mL 2021, 2021       Active Problems:  Patient Active Problem List   Diagnosis Code    UTI (urinary tract infection) N39.0    HTN (hypertension) I10    Hypothyroidism E03.9    Generalized weakness R53.1    Urinary retention R33.9       Isolation/Infection:   Isolation            No Isolation          Patient Infection Status       None to display            Nurse Assessment:  Last Vital Signs: /67   Pulse 100   Temp 98.3 °F (36.8 °C) (Temporal)   Resp 16   Ht 5' 4\" (1.626 m)   Wt 128 lb 12 oz (58.4 kg)   SpO2 95%   BMI 22.10 kg/m²     Last documented pain score (0-10 scale): Pain Level: 6  Last Weight:   Wt Readings from Last 1 Encounters:   22 128 lb 12 oz (58.4 kg)     Mental Status:  disoriented    IV Access:  - None    Nursing Mobility/ADLs:  Walking   Assisted  Transfer  Assisted  Bathing  Assisted  Dressing  Assisted  Toileting  Assisted  Feeding  Independent  Med Admin  Assisted  Med Delivery   whole    Wound Care Documentation and Therapy: Elimination:  Continence: Bowel: Yes  Bladder: No  Urinary Catheter: Insertion Date: chronic    Colostomy/Ileostomy/Ileal Conduit: No       Date of Last BM: 9/28/2022    Intake/Output Summary (Last 24 hours) at 9/29/2022 1554  Last data filed at 9/29/2022 1341  Gross per 24 hour   Intake --   Output 500 ml   Net -500 ml     I/O last 3 completed shifts: In: 240 [P.O.:240]  Out: 1000 [Urine:1000]    Safety Concerns:     [employer], History of Falls (last 30 days), and At Risk for Falls    Impairments/Disabilities:      Vision    Nutrition Therapy:  Current Nutrition Therapy:   - Oral Diet:  General    Routes of Feeding: Oral  Liquids: Thin Liquids  Daily Fluid Restriction: no  Last Modified Barium Swallow with Video (Video Swallowing Test): not done    Treatments at the Time of Hospital Discharge:   Respiratory Treatments: 2L NC prn  Oxygen Therapy:  is on oxygen at 2 L/min per nasal cannula. Ventilator:    - No ventilator support    Rehab Therapies: n/a  Weight Bearing Status/Restrictions: No weight bearing restrictions  Other Medical Equipment (for information only, NOT a DME order):  walker  Other Treatments: n/a    Patient's personal belongings (please select all that are sent with patient):  None    RN SIGNATURE:  Electronically signed by Yesenia Lopez RN on 9/29/22 at 3:57 PM EDT    CASE MANAGEMENT/SOCIAL WORK SECTION    Inpatient Status Date: 9/26/2022    Readmission Risk Assessment Score:  Readmission Risk              Risk of Unplanned Readmission:  15           Discharging to Facility/ Agency     [facility]  [address]  [place], [address]  Phone: [phone]  Fax: [phone]      / signature: Electronically signed by NILDA Gentile on 9/29/2022 at 4:05 PM      PHYSICIAN SECTION    Prognosis: Good    Condition at Discharge: Stable    Rehab Potential (if transferring to Rehab):  Fair    Recommended Labs or Other Treatments After Discharge:

## 2022-09-29 NOTE — PROGRESS NOTES
[facility] [address]   Phone: [phone]    Occupational Therapy    [] Initial Evaluation            [x] Daily Treatment Note         [] Discharge Summary      Patient: Jason Archer   : 1935   MRN: [id number]   Date of Service:  2022    Admitting Diagnosis:  UTI (urinary tract infection)  Current Admission Summary: Jason Archer is a 81 y.o. female with hx dementia, hypothyroid, HTN, legally blind, and OA. And then to the emergency room after a fall at [facility]. Patient fell backwards and hit her head according to ER notes there was no loss of consciousness. Patient does open her eyes to examiner but is unable to provide any history. She follows only minimal commands. She had a recent admission to  on 22, for AMS which had been gradual over the past year. She was initially treated for encephalitis. Her mental status was labile and she was having hallucinations. She was started on Seroquel and behaviors improved and was discharged to [facility]. Past Medical History:  has a past medical history of COPD (chronic obstructive pulmonary disease) ([facility] [address] Dementia ([facility] [address] Diverticulosis, Glaucoma, Hypertension, Hypothyroid, and Legal blindness. Past Surgical History:  has a past surgical history that includes eye surgery; Tonsillectomy; and joint replacement. Discharge Recommendations: Jason Archer scored a 14/24 on the AM-PAC ADL Inpatient form. Current research shows that an AM-PAC score of 17 or less is typically not associated with a discharge to the patient's home setting. Based on the patient's AM-PAC score and their current ADL deficits, it is recommended that the patient have 3-5 sessions per week of Occupational Therapy at d/c to increase the patient's independence. Please see assessment section for further patient specific details.     If patient discharges prior to next session this note will serve as a discharge summary. Please see below for the latest assessment towards goals. DME Required For Discharge: DME to be determined at next level of care, rolling walker    Precautions/Restrictions: high fall risk, modified diet (dysphagia soft and bite-sized)  Weight Bearing Restrictions: no restrictions  [] Right Upper Extremity  [] Left Upper Extremity [] Right Lower Extremity  [] Left Lower Extremity     Required Braces/Orthotics: no braces required   [] Right  [] Left  Positional Restrictions:no positional restrictions      Pre-Admission Information     Lives With: son    Type of Home: house  Recent Falls: at least 1 recent fall resulting in admission, multiple other falls in recent months resulting in recent admission to  as above  Comments: Pt reports she lives in a house with her son, but does not provide additional information due to confusion regarding most recent living situation. Due to pt not safe to return home at this time with need for skilled care at discharge, will hold gathering additional home environment information at this time. If pt improves to level that may allow safe discharge home, please call pt's son to gather additional information. Pt reports she has used a RW before, \"but it was stolen at [facility]. \" Pt unsure of additional DME.        Examination     Vision:   Vision Gross Assessment: Legally blind  Hearing:   hard of hearing, no hearing aid  Perception:   Some cues needed for motor planning  Posture:   Rounded shoulders, forward head, kyphotic  Sensation:   denies numbness and tingling  Proprioception:    WFL  Tone:   Normotonic  Coordination Testing:   Coordination and Movement Description: decreased speed, decreased accuracy    ROM:   (B) UE AROM WFL for BADLs  Strength:   (B) UE strength grossly WFL for BADLs    Decision Making: low complexity  Clinical Presentation: stable      Subjective    General: Patient supine in bed with RN present upon arrival, agreeable to therapy treatment session, patient with   Pain: 3/10. Location: BLE  Pain Interventions: pain medication in place prior to arrival, RN notified, and repositioned            Activities of Daily Living    Basic Activities of Daily Living  Feeding: stand by assistance requires verbal cueing Increased time to complete task requires assistance for beverage management  Feeding Comments: SBA for feeding, assistance for drink management due to decreased vision, increased time needed for task completion  Grooming: contact guard assistance requires verbal cueing Increased time to complete task  Grooming Comments: CGA for brushing face and putting in dentures, patient with increased time needed and minimal verbal cueing needed due to visual and cognitive deficits  Toileting Comments: Catheter in place   General Comments: Patient with gown change and brief change prior to therapist arrival, patient with minimal verbal cueing needed for redirection, sequencing, initiation and task completion for increased safety with functional ADLs. Instrumental Activities of Daily Living  No IADL completed on this date. Functional Mobility    Bed Mobility  Supine to Sit: maximum assistance  Rolling Left: maximum assistance  Scooting: maximum assistance  Comments: HOB slightly elevated, patient with increased time needed for bed mobility due to slowed movement and pain 3/10 in BLE feet/heels. Transfers  Sit to stand transfer:maximum assistance  Stand to sit transfer: maximum assistance  Comments: Patient with 3 unsuccessful stand attempts, 1 stand pivot transfer with maxA for support. Patient with significant posterior lean and maximal verbal, tactile, and physical cueing needed for hand placement/feet placement, sequencing, initiation and spatial awareness for increased safety with functional transfers. Functional Mobility:  Sitting Balance: minimal assistance.     Sitting Balance Comment: Daya progressing to CGA, patient with slight R lateral lean  Standing Balance: maximum assistance. Standing Balance Comment: maxA for standing balance with transfers  No functional mobility completed on this date secondary to due to decreased cognition, increased rigidity with movement and decreased standing balance. Other Therapeutic Interventions      Functional Outcomes  AM-PAC Inpatient Daily Activity Raw Score: 14      Cognition  Overall Cognitive Status: Impaired  Arousal/Alterness: appropriate responses to stimuli  Following Commands: follows one step commands with increased time  Attention Span: difficulty dividing attention  Memory: decreased recall of biographical information, decreased recall of recent events, decreased short term memory  Safety Judgement: decreased awareness of need for assistance  Problem Solving: decreased awareness of errors  Insights: decreased awareness of deficits  Initiation: requires cues for some  Sequencing: requires cues for some  Comments: Patient with some hallucinations this date, stating she thought something was going to blow up and that she slept on the floor last night. Patient pleasant and agreeable.     Orientation:    oriented to person, oriented to place, oriented to time, and oriented to situation  Patient was A&Ox4 for orientation questions, however with hallucinations this date   Command Following:   accurately follows one step commands     Education    Barriers To Learning: cognition, visual, and hearing  Patient Education: patient educated on goals, OT role and benefits, plan of care, energy conservation, disease specific education, transfer training, discharge recommendations  Learning Assessment:  patient will require reinforcement due to cognitive deficits    Assessment    Activity Tolerance: Patient tolerated treatment, however with increased pain 3/10 and decreased endurance  Impairments Requiring Therapeutic Intervention: decreased functional mobility, decreased ADL status, decreased safety awareness, decreased cognition, decreased endurance, decreased balance, decreased vision, decreased posture  Prognosis: good  Clinical Assessment: Pt presents below baseline level of function and is limited in the above areas impacting independence in functional mobility and ADLs. Pt is a high fall risk, requiring maxA for functional transfers and bed mobility and assist for all ADLs. Pt is not safe to discharge home. Pt would benefit from skilled inpatient OT services to address deficits above.    Safety Interventions: patient left in chair, chair alarm in place, call light within reach, patient at risk for falls, and nurse notified       Plan    Frequency: 3-5 x/per week  Current Treatment Recommendations: balance training, functional mobility training, transfer training, endurance training, patient/caregiver education, ADL/self-care training, cognitive reorientation, and safety education    Goals    Patient Goals: does not state     Short Term Goals:  Time Frame: d/c - no goals met this date 9/29/22  Patient will complete upper body ADL at supervision   Patient will complete lower body ADL at stand by assistance   Patient will complete functional transfers at supervision   Patient will complete functional mobility at stand by assistance   Patient will complete bed mobility at supervision        Therapy Session Time     Individual Group Co-treatment   Time In 0814     Time Out 0907     Minutes 53          Timed Code Treatment Minutes:  53 minutes    Total Treatment Minutes:  53 minutes     Electronically Signed By: TRISH Gaffney/GILDARDO [id number]

## 2022-09-30 VITALS
DIASTOLIC BLOOD PRESSURE: 65 MMHG | HEART RATE: 100 BPM | SYSTOLIC BLOOD PRESSURE: 131 MMHG | RESPIRATION RATE: 18 BRPM | TEMPERATURE: 97.7 F | HEIGHT: 64 IN | WEIGHT: 128.31 LBS | BODY MASS INDEX: 21.91 KG/M2 | OXYGEN SATURATION: 93 %

## 2022-09-30 LAB
BLOOD CULTURE, ROUTINE: NORMAL
CULTURE, BLOOD 2: NORMAL

## 2022-09-30 PROCEDURE — 6360000002 HC RX W HCPCS: Performed by: INTERNAL MEDICINE

## 2022-09-30 PROCEDURE — 2580000003 HC RX 258: Performed by: NURSE PRACTITIONER

## 2022-09-30 PROCEDURE — 6370000000 HC RX 637 (ALT 250 FOR IP): Performed by: STUDENT IN AN ORGANIZED HEALTH CARE EDUCATION/TRAINING PROGRAM

## 2022-09-30 PROCEDURE — 6370000000 HC RX 637 (ALT 250 FOR IP): Performed by: INTERNAL MEDICINE

## 2022-09-30 PROCEDURE — 51702 INSERT TEMP BLADDER CATH: CPT

## 2022-09-30 RX ADMIN — QUETIAPINE FUMARATE 12.5 MG: 25 TABLET ORAL at 07:30

## 2022-09-30 RX ADMIN — Medication 10 ML: at 07:39

## 2022-09-30 RX ADMIN — SULFAMETHOXAZOLE AND TRIMETHOPRIM 1 TABLET: 800; 160 TABLET ORAL at 07:38

## 2022-09-30 RX ADMIN — ENOXAPARIN SODIUM 40 MG: 100 INJECTION SUBCUTANEOUS at 07:37

## 2022-09-30 RX ADMIN — LEVOTHYROXINE SODIUM 112 MCG: 0.11 TABLET ORAL at 05:30

## 2022-09-30 RX ADMIN — TROSPIUM CHLORIDE 20 MG: 20 TABLET, FILM COATED ORAL at 07:38

## 2022-09-30 RX ADMIN — NIFEDIPINE 60 MG: 30 TABLET, EXTENDED RELEASE ORAL at 07:38

## 2022-09-30 ASSESSMENT — PAIN SCALES - GENERAL
PAINLEVEL_OUTOF10: 0
PAINLEVEL_OUTOF10: 5

## 2022-09-30 NOTE — CARE COORDINATION
Discharge Planning:      (ANNABELLE) called [facility] admissions staff, Joann Alva ([phone]), and requested a callback to confirm that patient's pre-cert was approved and patient is able to return to facility today. CM provided callback information. BRITNEY Alejandre, [facility] -   [phone]    Electronically signed by NILDA Cerrato on 9/30/2022 at 8:31 AM      Update at [phone]:  CM received a callback from  admissions staff, Joann Alva, who confirmed that patient can come back to their facility today and agreed to 11:45am transportation time.       BRITNEY Alejandre, [facility] -   [phone]    Electronically signed by NILDA Cerrato on 9/30/2022 at 8:54 AM

## 2022-09-30 NOTE — PROGRESS NOTES
Report called to freda MORFIN nurse on [place] unit  Dentures  and blue robe given to transport personnel.   See flowsheet see mar

## 2022-09-30 NOTE — PLAN OF CARE
Problem: Discharge Planning  Goal: Discharge to home or other facility with appropriate resources  0/19/4041 5357 by Corey Gabriel RN  Outcome: Adequate for Discharge  9/88/3048 4363 by Corey Gabriel RN  Outcome: Adequate for Discharge  Flowsheets (Taken 9/30/2022 6564)  Discharge to home or other facility with appropriate resources:   Identify barriers to discharge with patient and caregiver   Arrange for needed discharge resources and transportation as appropriate   Identify discharge learning needs (meds, wound care, etc)   Refer to discharge planning if patient needs post-hospital services based on physician order or complex needs related to functional status, cognitive ability or social support system     Problem: Pain  Goal: Verbalizes/displays adequate comfort level or baseline comfort level  3/36/5817 6530 by Corey Gabriel RN  Outcome: Adequate for Discharge  9/40/4506 3170 by Corey Gabriel RN  Outcome: Adequate for Discharge     Problem: Safety - Adult  Goal: Free from fall injury  3/82/2433 8264 by Corey Gabriel RN  Outcome: Adequate for Discharge  3/54/1475 1852 by Corey Gabriel RN  Outcome: Adequate for Discharge     Problem: Skin/Tissue Integrity  Goal: Absence of new skin breakdown  Description: 1. Monitor for areas of redness and/or skin breakdown  2. Assess vascular access sites hourly  3. Every 4-6 hours minimum:  Change oxygen saturation probe site  4. Every 4-6 hours:  If on nasal continuous positive airway pressure, respiratory therapy assess nares and determine need for appliance change or resting period.   2/29/3384 9780 by Corey Gabriel RN  Outcome: Adequate for Discharge  6/33/6824 2771 by Corey Gabriel RN  Outcome: Adequate for Discharge     Problem: ABCDS Injury Assessment  Goal: Absence of physical injury  7/16/9060 6036 by Corey Gabriel RN  Outcome: Adequate for Discharge  6/69/8752 5573 by Corey Gabriel RN  Outcome: Adequate for Discharge

## 2022-09-30 NOTE — PLAN OF CARE
Problem: Discharge Planning  Goal: Discharge to home or other facility with appropriate resources  Outcome: Adequate for Discharge  Flowsheets (Taken 9/30/2022 5030)  Discharge to home or other facility with appropriate resources:   Identify barriers to discharge with patient and caregiver   Arrange for needed discharge resources and transportation as appropriate   Identify discharge learning needs (meds, wound care, etc)   Refer to discharge planning if patient needs post-hospital services based on physician order or complex needs related to functional status, cognitive ability or social support system     Problem: Pain  Goal: Verbalizes/displays adequate comfort level or baseline comfort level  Outcome: Adequate for Discharge     Problem: Safety - Adult  Goal: Free from fall injury  Outcome: Adequate for Discharge     Problem: Skin/Tissue Integrity  Goal: Absence of new skin breakdown  Description: 1. Monitor for areas of redness and/or skin breakdown  2. Assess vascular access sites hourly  3. Every 4-6 hours minimum:  Change oxygen saturation probe site  4. Every 4-6 hours:  If on nasal continuous positive airway pressure, respiratory therapy assess nares and determine need for appliance change or resting period.   Outcome: Adequate for Discharge     Problem: ABCDS Injury Assessment  Goal: Absence of physical injury  Outcome: Adequate for Discharge

## 2022-09-30 NOTE — CARE COORDINATION
Discharge Plan:  Patient discharge to:    [facility]  [address]  [place], [address]  Phone: [phone]  Fax: [phone]    ESTELLE fax[place] Faces[place] and [address] [place] to [phone]  RN- Karl Carreno to call report to [phone] transport with [address],  time 11:45am    Daughter, Na Jiménez (on emergency contact list), advised of discharge and in agreement. HENS not needed. SW intervention complete.       Meredith MUNGUIA, BRITNEY, [place] [address]  Social Work -   [phone]    Electronically signed by NILDA Pena on 9/30/2022 at 8:57 AM

## 2022-10-29 PROBLEM — N39.0 UTI (URINARY TRACT INFECTION): Status: RESOLVED | Noted: 2022-09-26 | Resolved: 2022-10-29

## 2022-12-22 ENCOUNTER — HOSPITAL ENCOUNTER (INPATIENT)
Age: 87
LOS: 8 days | Discharge: HOSPICE | End: 2022-12-30
Attending: EMERGENCY MEDICINE | Admitting: STUDENT IN AN ORGANIZED HEALTH CARE EDUCATION/TRAINING PROGRAM
Payer: COMMERCIAL

## 2022-12-22 ENCOUNTER — APPOINTMENT (OUTPATIENT)
Dept: GENERAL RADIOLOGY | Age: 87
End: 2022-12-22
Payer: COMMERCIAL

## 2022-12-22 DIAGNOSIS — F03.93 DEMENTIA WITH MOOD DISTURBANCE, UNSPECIFIED DEMENTIA SEVERITY, UNSPECIFIED DEMENTIA TYPE: ICD-10-CM

## 2022-12-22 DIAGNOSIS — J44.1 COPD WITH ACUTE EXACERBATION (HCC): ICD-10-CM

## 2022-12-22 DIAGNOSIS — J96.02 ACUTE RESPIRATORY FAILURE WITH HYPOXIA AND HYPERCAPNIA (HCC): Primary | ICD-10-CM

## 2022-12-22 DIAGNOSIS — U07.1 COVID-19: ICD-10-CM

## 2022-12-22 DIAGNOSIS — Z51.5 END OF LIFE CARE: ICD-10-CM

## 2022-12-22 DIAGNOSIS — L89.229 PRESSURE INJURY OF SKIN OF LEFT HIP, UNSPECIFIED INJURY STAGE: ICD-10-CM

## 2022-12-22 DIAGNOSIS — J96.01 ACUTE RESPIRATORY FAILURE WITH HYPOXIA AND HYPERCAPNIA (HCC): Primary | ICD-10-CM

## 2022-12-22 DIAGNOSIS — N39.0 URINARY TRACT INFECTION IN FEMALE: ICD-10-CM

## 2022-12-22 PROBLEM — I50.30 (HFPEF) HEART FAILURE WITH PRESERVED EJECTION FRACTION (HCC): Status: ACTIVE | Noted: 2022-12-22

## 2022-12-22 PROBLEM — A41.9 SEPSIS (HCC): Status: ACTIVE | Noted: 2022-12-22

## 2022-12-22 PROBLEM — F03.90 DEMENTIA (HCC): Status: ACTIVE | Noted: 2022-12-22

## 2022-12-22 PROBLEM — J44.9 COPD (CHRONIC OBSTRUCTIVE PULMONARY DISEASE) (HCC): Status: ACTIVE | Noted: 2022-12-22

## 2022-12-22 LAB
A/G RATIO: 0.8 (ref 1.1–2.2)
ALBUMIN SERPL-MCNC: 3.1 G/DL (ref 3.4–5)
ALP BLD-CCNC: 62 U/L (ref 40–129)
ALT SERPL-CCNC: 6 U/L (ref 10–40)
ANION GAP SERPL CALCULATED.3IONS-SCNC: 10 MMOL/L (ref 3–16)
AST SERPL-CCNC: 14 U/L (ref 15–37)
BACTERIA: ABNORMAL /HPF
BASE EXCESS VENOUS: 3.4 MMOL/L (ref -3–3)
BASOPHILS ABSOLUTE: 0 K/UL (ref 0–0.2)
BASOPHILS RELATIVE PERCENT: 0.3 %
BILIRUB SERPL-MCNC: 0.4 MG/DL (ref 0–1)
BILIRUBIN URINE: ABNORMAL
BLOOD, URINE: ABNORMAL
BUN BLDV-MCNC: 42 MG/DL (ref 7–20)
CALCIUM SERPL-MCNC: 9.3 MG/DL (ref 8.3–10.6)
CARBOXYHEMOGLOBIN: 3.1 % (ref 0–1.5)
CHLORIDE BLD-SCNC: 108 MMOL/L (ref 99–110)
CLARITY: ABNORMAL
CO2: 29 MMOL/L (ref 21–32)
COLOR: YELLOW
COMMENT UA: ABNORMAL
CREAT SERPL-MCNC: 1 MG/DL (ref 0.6–1.2)
EOSINOPHILS ABSOLUTE: 0 K/UL (ref 0–0.6)
EOSINOPHILS RELATIVE PERCENT: 0.1 %
EPITHELIAL CELLS, UA: ABNORMAL /HPF (ref 0–5)
GFR SERPL CREATININE-BSD FRML MDRD: 55 ML/MIN/{1.73_M2}
GLUCOSE BLD-MCNC: 89 MG/DL (ref 70–99)
GLUCOSE URINE: NEGATIVE MG/DL
HCO3 VENOUS: 29.7 MMOL/L (ref 23–29)
HCT VFR BLD CALC: 37.4 % (ref 36–48)
HEMOGLOBIN, VEN, REDUCED: 7 %
HEMOGLOBIN: 12 G/DL (ref 12–16)
KETONES, URINE: NEGATIVE MG/DL
LACTIC ACID, SEPSIS: 1.6 MMOL/L (ref 0.4–1.9)
LEUKOCYTE ESTERASE, URINE: ABNORMAL
LYMPHOCYTES ABSOLUTE: 1 K/UL (ref 1–5.1)
LYMPHOCYTES RELATIVE PERCENT: 6.6 %
MCH RBC QN AUTO: 30 PG (ref 26–34)
MCHC RBC AUTO-ENTMCNC: 32.1 G/DL (ref 31–36)
MCV RBC AUTO: 93.4 FL (ref 80–100)
METHEMOGLOBIN VENOUS: 0 %
MICROSCOPIC EXAMINATION: YES
MONOCYTES ABSOLUTE: 0.8 K/UL (ref 0–1.3)
MONOCYTES RELATIVE PERCENT: 5.4 %
NEUTROPHILS ABSOLUTE: 13.4 K/UL (ref 1.7–7.7)
NEUTROPHILS RELATIVE PERCENT: 87.6 %
NITRITE, URINE: NEGATIVE
O2 CONTENT, VEN: 15 VOL %
O2 SAT, VEN: 92 %
O2 THERAPY: ABNORMAL
PCO2, VEN: 52.1 MMHG (ref 40–50)
PDW BLD-RTO: 15.6 % (ref 12.4–15.4)
PH UA: 5 (ref 5–8)
PH VENOUS: 7.36 (ref 7.35–7.45)
PLATELET # BLD: 283 K/UL (ref 135–450)
PMV BLD AUTO: 8.9 FL (ref 5–10.5)
PO2, VEN: 60.8 MMHG (ref 25–40)
POTASSIUM REFLEX MAGNESIUM: 4.7 MMOL/L (ref 3.5–5.1)
PRO-BNP: 1458 PG/ML (ref 0–449)
PROCALCITONIN: 0.11 NG/ML (ref 0–0.15)
PROTEIN UA: NEGATIVE MG/DL
RAPID INFLUENZA  B AGN: NEGATIVE
RAPID INFLUENZA A AGN: NEGATIVE
RBC # BLD: 4 M/UL (ref 4–5.2)
RBC UA: ABNORMAL /HPF (ref 0–4)
SODIUM BLD-SCNC: 147 MMOL/L (ref 136–145)
SPECIFIC GRAVITY UA: 1.01 (ref 1–1.03)
TCO2 CALC VENOUS: 70 MMOL/L
TOTAL PROTEIN: 6.9 G/DL (ref 6.4–8.2)
TROPONIN: 0.09 NG/ML
URINE REFLEX TO CULTURE: YES
URINE TYPE: ABNORMAL
UROBILINOGEN, URINE: 0.2 E.U./DL
WBC # BLD: 15.3 K/UL (ref 4–11)
WBC UA: ABNORMAL /HPF (ref 0–5)

## 2022-12-22 PROCEDURE — 87086 URINE CULTURE/COLONY COUNT: CPT

## 2022-12-22 PROCEDURE — 96365 THER/PROPH/DIAG IV INF INIT: CPT

## 2022-12-22 PROCEDURE — 83605 ASSAY OF LACTIC ACID: CPT

## 2022-12-22 PROCEDURE — 96375 TX/PRO/DX INJ NEW DRUG ADDON: CPT

## 2022-12-22 PROCEDURE — 2580000003 HC RX 258: Performed by: EMERGENCY MEDICINE

## 2022-12-22 PROCEDURE — 80053 COMPREHEN METABOLIC PANEL: CPT

## 2022-12-22 PROCEDURE — 99285 EMERGENCY DEPT VISIT HI MDM: CPT

## 2022-12-22 PROCEDURE — 87040 BLOOD CULTURE FOR BACTERIA: CPT

## 2022-12-22 PROCEDURE — 94640 AIRWAY INHALATION TREATMENT: CPT

## 2022-12-22 PROCEDURE — 6360000002 HC RX W HCPCS: Performed by: EMERGENCY MEDICINE

## 2022-12-22 PROCEDURE — 84443 ASSAY THYROID STIM HORMONE: CPT

## 2022-12-22 PROCEDURE — 81001 URINALYSIS AUTO W/SCOPE: CPT

## 2022-12-22 PROCEDURE — 85025 COMPLETE CBC W/AUTO DIFF WBC: CPT

## 2022-12-22 PROCEDURE — 87804 INFLUENZA ASSAY W/OPTIC: CPT

## 2022-12-22 PROCEDURE — 71045 X-RAY EXAM CHEST 1 VIEW: CPT

## 2022-12-22 PROCEDURE — 84484 ASSAY OF TROPONIN QUANT: CPT

## 2022-12-22 PROCEDURE — U0003 INFECTIOUS AGENT DETECTION BY NUCLEIC ACID (DNA OR RNA); SEVERE ACUTE RESPIRATORY SYNDROME CORONAVIRUS 2 (SARS-COV-2) (CORONAVIRUS DISEASE [COVID-19]), AMPLIFIED PROBE TECHNIQUE, MAKING USE OF HIGH THROUGHPUT TECHNOLOGIES AS DESCRIBED BY CMS-2020-01-R: HCPCS

## 2022-12-22 PROCEDURE — 2060000000 HC ICU INTERMEDIATE R&B

## 2022-12-22 PROCEDURE — U0005 INFEC AGEN DETEC AMPLI PROBE: HCPCS

## 2022-12-22 PROCEDURE — 82803 BLOOD GASES ANY COMBINATION: CPT

## 2022-12-22 PROCEDURE — 36415 COLL VENOUS BLD VENIPUNCTURE: CPT

## 2022-12-22 PROCEDURE — 6370000000 HC RX 637 (ALT 250 FOR IP): Performed by: EMERGENCY MEDICINE

## 2022-12-22 PROCEDURE — 93005 ELECTROCARDIOGRAM TRACING: CPT | Performed by: EMERGENCY MEDICINE

## 2022-12-22 PROCEDURE — 83880 ASSAY OF NATRIURETIC PEPTIDE: CPT

## 2022-12-22 PROCEDURE — 84145 PROCALCITONIN (PCT): CPT

## 2022-12-22 RX ORDER — QUETIAPINE FUMARATE 25 MG/1
25 TABLET, FILM COATED ORAL NIGHTLY
Status: DISCONTINUED | OUTPATIENT
Start: 2022-12-23 | End: 2022-12-29

## 2022-12-22 RX ORDER — LEVOTHYROXINE SODIUM 112 UG/1
112 TABLET ORAL DAILY
Status: DISCONTINUED | OUTPATIENT
Start: 2022-12-23 | End: 2022-12-29

## 2022-12-22 RX ORDER — ONDANSETRON 4 MG/1
4 TABLET, ORALLY DISINTEGRATING ORAL EVERY 8 HOURS PRN
Status: DISCONTINUED | OUTPATIENT
Start: 2022-12-22 | End: 2022-12-30 | Stop reason: HOSPADM

## 2022-12-22 RX ORDER — LANOLIN ALCOHOL/MO/W.PET/CERES
6 CREAM (GRAM) TOPICAL NIGHTLY PRN
Status: DISCONTINUED | OUTPATIENT
Start: 2022-12-23 | End: 2022-12-30 | Stop reason: HOSPADM

## 2022-12-22 RX ORDER — SODIUM CHLORIDE 0.9 % (FLUSH) 0.9 %
5-40 SYRINGE (ML) INJECTION EVERY 12 HOURS SCHEDULED
Status: DISCONTINUED | OUTPATIENT
Start: 2022-12-22 | End: 2022-12-30 | Stop reason: HOSPADM

## 2022-12-22 RX ORDER — DEXAMETHASONE SODIUM PHOSPHATE 10 MG/ML
6 INJECTION, SOLUTION INTRAMUSCULAR; INTRAVENOUS ONCE
Status: COMPLETED | OUTPATIENT
Start: 2022-12-22 | End: 2022-12-22

## 2022-12-22 RX ORDER — SODIUM CHLORIDE, SODIUM LACTATE, POTASSIUM CHLORIDE, CALCIUM CHLORIDE 600; 310; 30; 20 MG/100ML; MG/100ML; MG/100ML; MG/100ML
INJECTION, SOLUTION INTRAVENOUS CONTINUOUS
Status: DISCONTINUED | OUTPATIENT
Start: 2022-12-22 | End: 2022-12-23

## 2022-12-22 RX ORDER — POTASSIUM CHLORIDE 7.45 MG/ML
10 INJECTION INTRAVENOUS PRN
Status: DISCONTINUED | OUTPATIENT
Start: 2022-12-22 | End: 2022-12-25

## 2022-12-22 RX ORDER — POLYETHYLENE GLYCOL 3350 17 G/17G
17 POWDER, FOR SOLUTION ORAL DAILY PRN
Status: DISCONTINUED | OUTPATIENT
Start: 2022-12-22 | End: 2022-12-30 | Stop reason: HOSPADM

## 2022-12-22 RX ORDER — ACETAMINOPHEN 325 MG/1
650 TABLET ORAL EVERY 6 HOURS PRN
Status: DISCONTINUED | OUTPATIENT
Start: 2022-12-22 | End: 2022-12-30 | Stop reason: HOSPADM

## 2022-12-22 RX ORDER — ONDANSETRON 2 MG/ML
4 INJECTION INTRAMUSCULAR; INTRAVENOUS EVERY 6 HOURS PRN
Status: DISCONTINUED | OUTPATIENT
Start: 2022-12-22 | End: 2022-12-27

## 2022-12-22 RX ORDER — ALBUTEROL SULFATE 90 UG/1
2 AEROSOL, METERED RESPIRATORY (INHALATION) PRN
Status: DISCONTINUED | OUTPATIENT
Start: 2022-12-22 | End: 2022-12-30 | Stop reason: HOSPADM

## 2022-12-22 RX ORDER — SODIUM CHLORIDE 0.9 % (FLUSH) 0.9 %
5-40 SYRINGE (ML) INJECTION PRN
Status: DISCONTINUED | OUTPATIENT
Start: 2022-12-22 | End: 2022-12-27

## 2022-12-22 RX ORDER — IPRATROPIUM BROMIDE AND ALBUTEROL SULFATE 2.5; .5 MG/3ML; MG/3ML
1 SOLUTION RESPIRATORY (INHALATION) ONCE
Status: COMPLETED | OUTPATIENT
Start: 2022-12-22 | End: 2022-12-22

## 2022-12-22 RX ORDER — ALBUTEROL SULFATE 2.5 MG/3ML
2.5 SOLUTION RESPIRATORY (INHALATION) ONCE
Status: COMPLETED | OUTPATIENT
Start: 2022-12-22 | End: 2022-12-22

## 2022-12-22 RX ORDER — OXYCODONE AND ACETAMINOPHEN 10; 325 MG/1; MG/1
1 TABLET ORAL EVERY 12 HOURS
Status: DISCONTINUED | OUTPATIENT
Start: 2022-12-22 | End: 2022-12-23 | Stop reason: ALTCHOICE

## 2022-12-22 RX ORDER — POTASSIUM CHLORIDE 20 MEQ/1
40 TABLET, EXTENDED RELEASE ORAL PRN
Status: DISCONTINUED | OUTPATIENT
Start: 2022-12-22 | End: 2022-12-25

## 2022-12-22 RX ORDER — SODIUM CHLORIDE 9 MG/ML
INJECTION, SOLUTION INTRAVENOUS PRN
Status: DISCONTINUED | OUTPATIENT
Start: 2022-12-22 | End: 2022-12-27

## 2022-12-22 RX ORDER — MAGNESIUM SULFATE IN WATER 40 MG/ML
2000 INJECTION, SOLUTION INTRAVENOUS PRN
Status: DISCONTINUED | OUTPATIENT
Start: 2022-12-22 | End: 2022-12-26

## 2022-12-22 RX ORDER — TAMSULOSIN HYDROCHLORIDE 0.4 MG/1
0.4 CAPSULE ORAL NIGHTLY
Status: DISCONTINUED | OUTPATIENT
Start: 2022-12-23 | End: 2022-12-29

## 2022-12-22 RX ORDER — ACETAMINOPHEN 650 MG/1
650 SUPPOSITORY RECTAL EVERY 6 HOURS PRN
Status: DISCONTINUED | OUTPATIENT
Start: 2022-12-22 | End: 2022-12-30 | Stop reason: HOSPADM

## 2022-12-22 RX ORDER — ENOXAPARIN SODIUM 100 MG/ML
40 INJECTION SUBCUTANEOUS DAILY
Status: DISCONTINUED | OUTPATIENT
Start: 2022-12-23 | End: 2022-12-23

## 2022-12-22 RX ADMIN — CEFTRIAXONE SODIUM 1000 MG: 1 INJECTION, POWDER, FOR SOLUTION INTRAMUSCULAR; INTRAVENOUS at 20:21

## 2022-12-22 RX ADMIN — DEXAMETHASONE SODIUM PHOSPHATE 6 MG: 10 INJECTION, SOLUTION INTRAMUSCULAR; INTRAVENOUS at 19:32

## 2022-12-22 RX ADMIN — ALBUTEROL SULFATE 2.5 MG: 2.5 SOLUTION RESPIRATORY (INHALATION) at 19:41

## 2022-12-22 RX ADMIN — IPRATROPIUM BROMIDE AND ALBUTEROL SULFATE 1 AMPULE: .5; 2.5 SOLUTION RESPIRATORY (INHALATION) at 19:28

## 2022-12-22 ASSESSMENT — PAIN SCALES - PAIN ASSESSMENT IN ADVANCED DEMENTIA (PAINAD)
FACIALEXPRESSION: 2
NEGVOCALIZATION: 1
BREATHING: 0
TOTALSCORE: 7
CONSOLABILITY: 2
BODYLANGUAGE: 2

## 2022-12-23 LAB
ALBUMIN SERPL-MCNC: 2.9 G/DL (ref 3.4–5)
AMMONIA: 15 UMOL/L (ref 11–51)
ANION GAP SERPL CALCULATED.3IONS-SCNC: 11 MMOL/L (ref 3–16)
BASOPHILS ABSOLUTE: 0 K/UL (ref 0–0.2)
BASOPHILS RELATIVE PERCENT: 0.3 %
BUN BLDV-MCNC: 44 MG/DL (ref 7–20)
CALCIUM SERPL-MCNC: 9 MG/DL (ref 8.3–10.6)
CHLORIDE BLD-SCNC: 112 MMOL/L (ref 99–110)
CO2: 25 MMOL/L (ref 21–32)
CREAT SERPL-MCNC: 1 MG/DL (ref 0.6–1.2)
EKG ATRIAL RATE: 97 BPM
EKG DIAGNOSIS: NORMAL
EKG P AXIS: 54 DEGREES
EKG P-R INTERVAL: 182 MS
EKG Q-T INTERVAL: 388 MS
EKG QRS DURATION: 114 MS
EKG QTC CALCULATION (BAZETT): 492 MS
EKG R AXIS: -76 DEGREES
EKG T AXIS: 38 DEGREES
EKG VENTRICULAR RATE: 97 BPM
EOSINOPHILS ABSOLUTE: 0 K/UL (ref 0–0.6)
EOSINOPHILS RELATIVE PERCENT: 0 %
GFR SERPL CREATININE-BSD FRML MDRD: 55 ML/MIN/{1.73_M2}
GLUCOSE BLD-MCNC: 116 MG/DL (ref 70–99)
GLUCOSE BLD-MCNC: 168 MG/DL (ref 70–99)
HCT VFR BLD CALC: 33.3 % (ref 36–48)
HEMOGLOBIN: 10.8 G/DL (ref 12–16)
LYMPHOCYTES ABSOLUTE: 0.4 K/UL (ref 1–5.1)
LYMPHOCYTES RELATIVE PERCENT: 2.4 %
MAGNESIUM: 1.7 MG/DL (ref 1.8–2.4)
MCH RBC QN AUTO: 29.3 PG (ref 26–34)
MCHC RBC AUTO-ENTMCNC: 32.4 G/DL (ref 31–36)
MCV RBC AUTO: 90.5 FL (ref 80–100)
MONOCYTES ABSOLUTE: 0.2 K/UL (ref 0–1.3)
MONOCYTES RELATIVE PERCENT: 1.4 %
NEUTROPHILS ABSOLUTE: 14.7 K/UL (ref 1.7–7.7)
NEUTROPHILS RELATIVE PERCENT: 95.9 %
PDW BLD-RTO: 15.1 % (ref 12.4–15.4)
PERFORMED ON: ABNORMAL
PHOSPHORUS: 4 MG/DL (ref 2.5–4.9)
PLATELET # BLD: 247 K/UL (ref 135–450)
PMV BLD AUTO: 8.4 FL (ref 5–10.5)
POTASSIUM SERPL-SCNC: 4.5 MMOL/L (ref 3.5–5.1)
RBC # BLD: 3.68 M/UL (ref 4–5.2)
RSV RAPID ANTIGEN: NEGATIVE
SARS-COV-2: DETECTED
SODIUM BLD-SCNC: 148 MMOL/L (ref 136–145)
TROPONIN: 0.06 NG/ML
TSH REFLEX: 0.7 UIU/ML (ref 0.27–4.2)
URINE CULTURE, ROUTINE: NORMAL
WBC # BLD: 15.3 K/UL (ref 4–11)

## 2022-12-23 PROCEDURE — 87641 MR-STAPH DNA AMP PROBE: CPT

## 2022-12-23 PROCEDURE — 36415 COLL VENOUS BLD VENIPUNCTURE: CPT

## 2022-12-23 PROCEDURE — 6360000002 HC RX W HCPCS: Performed by: STUDENT IN AN ORGANIZED HEALTH CARE EDUCATION/TRAINING PROGRAM

## 2022-12-23 PROCEDURE — 83735 ASSAY OF MAGNESIUM: CPT

## 2022-12-23 PROCEDURE — 2580000003 HC RX 258: Performed by: STUDENT IN AN ORGANIZED HEALTH CARE EDUCATION/TRAINING PROGRAM

## 2022-12-23 PROCEDURE — 97161 PT EVAL LOW COMPLEX 20 MIN: CPT

## 2022-12-23 PROCEDURE — 87807 RSV ASSAY W/OPTIC: CPT

## 2022-12-23 PROCEDURE — 6370000000 HC RX 637 (ALT 250 FOR IP): Performed by: STUDENT IN AN ORGANIZED HEALTH CARE EDUCATION/TRAINING PROGRAM

## 2022-12-23 PROCEDURE — 92610 EVALUATE SWALLOWING FUNCTION: CPT

## 2022-12-23 PROCEDURE — 2060000000 HC ICU INTERMEDIATE R&B

## 2022-12-23 PROCEDURE — 84484 ASSAY OF TROPONIN QUANT: CPT

## 2022-12-23 PROCEDURE — 97530 THERAPEUTIC ACTIVITIES: CPT

## 2022-12-23 PROCEDURE — 80069 RENAL FUNCTION PANEL: CPT

## 2022-12-23 PROCEDURE — 93010 ELECTROCARDIOGRAM REPORT: CPT | Performed by: INTERNAL MEDICINE

## 2022-12-23 PROCEDURE — 85025 COMPLETE CBC W/AUTO DIFF WBC: CPT

## 2022-12-23 PROCEDURE — 92526 ORAL FUNCTION THERAPY: CPT

## 2022-12-23 PROCEDURE — 2580000003 HC RX 258: Performed by: HOSPITALIST

## 2022-12-23 PROCEDURE — 6360000002 HC RX W HCPCS: Performed by: HOSPITALIST

## 2022-12-23 PROCEDURE — 82140 ASSAY OF AMMONIA: CPT

## 2022-12-23 PROCEDURE — 97165 OT EVAL LOW COMPLEX 30 MIN: CPT

## 2022-12-23 RX ORDER — DRONABINOL 5 MG/1
5 CAPSULE ORAL
Status: ON HOLD | COMMUNITY
Start: 2022-12-12 | End: 2022-12-30 | Stop reason: HOSPADM

## 2022-12-23 RX ORDER — ENOXAPARIN SODIUM 100 MG/ML
30 INJECTION SUBCUTANEOUS DAILY
Status: DISCONTINUED | OUTPATIENT
Start: 2022-12-24 | End: 2022-12-27

## 2022-12-23 RX ORDER — ACETAMINOPHEN 325 MG/1
650 TABLET ORAL EVERY 6 HOURS PRN
Status: ON HOLD | COMMUNITY
End: 2022-12-30 | Stop reason: HOSPADM

## 2022-12-23 RX ORDER — M-VIT,TX,IRON,MINS/CALC/FOLIC 27MG-0.4MG
1 TABLET ORAL DAILY
Status: ON HOLD | COMMUNITY
End: 2022-12-30 | Stop reason: HOSPADM

## 2022-12-23 RX ORDER — DEXTROSE MONOHYDRATE 50 MG/ML
INJECTION, SOLUTION INTRAVENOUS CONTINUOUS
Status: DISCONTINUED | OUTPATIENT
Start: 2022-12-23 | End: 2022-12-24

## 2022-12-23 RX ADMIN — MAGNESIUM SULFATE HEPTAHYDRATE 2000 MG: 40 INJECTION, SOLUTION INTRAVENOUS at 13:41

## 2022-12-23 RX ADMIN — DEXTROSE MONOHYDRATE: 50 INJECTION, SOLUTION INTRAVENOUS at 13:32

## 2022-12-23 RX ADMIN — SODIUM CHLORIDE, POTASSIUM CHLORIDE, SODIUM LACTATE AND CALCIUM CHLORIDE: 600; 310; 30; 20 INJECTION, SOLUTION INTRAVENOUS at 08:23

## 2022-12-23 RX ADMIN — CEFTRIAXONE SODIUM 1000 MG: 1 INJECTION, POWDER, FOR SOLUTION INTRAMUSCULAR; INTRAVENOUS at 20:27

## 2022-12-23 RX ADMIN — TAMSULOSIN HYDROCHLORIDE 0.4 MG: 0.4 CAPSULE ORAL at 20:23

## 2022-12-23 RX ADMIN — SODIUM CHLORIDE, POTASSIUM CHLORIDE, SODIUM LACTATE AND CALCIUM CHLORIDE: 600; 310; 30; 20 INJECTION, SOLUTION INTRAVENOUS at 00:21

## 2022-12-23 RX ADMIN — Medication 10 ML: at 08:22

## 2022-12-23 RX ADMIN — Medication 10 ML: at 20:23

## 2022-12-23 RX ADMIN — ENOXAPARIN SODIUM 40 MG: 100 INJECTION SUBCUTANEOUS at 08:22

## 2022-12-23 RX ADMIN — Medication 10 ML: at 20:24

## 2022-12-23 RX ADMIN — QUETIAPINE FUMARATE 25 MG: 25 TABLET ORAL at 20:23

## 2022-12-23 ASSESSMENT — PAIN SCALES - PAIN ASSESSMENT IN ADVANCED DEMENTIA (PAINAD)
FACIALEXPRESSION: 2
CONSOLABILITY: 0
FACIALEXPRESSION: 0
TOTALSCORE: 5
NEGVOCALIZATION: 0
BREATHING: 0
BODYLANGUAGE: 2
BODYLANGUAGE: 0
TOTALSCORE: 0
NEGVOCALIZATION: 1
CONSOLABILITY: 0
BREATHING: 0

## 2022-12-23 NOTE — PROGRESS NOTES
This RN spoke with Rn at facility regarding patient. Per facility, patient eats a mechanical soft diet but is a total feed. Patient also has chronic urinary catheter for urinary retention. [Dear  ___] : Dear  [unfilled], [Consult Letter:] : I had the pleasure of evaluating your patient, [unfilled]. [Please see my note below.] : Please see my note below. [Consult Closing:] : Thank you very much for allowing me to participate in the care of this patient.  If you have any questions, please do not hesitate to contact me. [Sincerely,] : Sincerely, [FreeTextEntry2] : Kellie Bear MD\par \par [address] [facility]\par [place] [address] \par \par  [FreeTextEntry3] : Lacho Jimenez MD\par

## 2022-12-23 NOTE — PROGRESS NOTES
Facility/Department: [address]  Initial Assessment  DYSPHAGIA BEDSIDE SWALLOW EVALUATION     Patient: Porter Sanford   : 1935   MRN: [id number]      Evaluation Date: 2022   Admitting Diagnosis: COPD with acute exacerbation ([facility] [address] [J44.1]  Sepsis ([facility] [address] [A41.9]  Acute respiratory failure with hypoxia and hypercapnia ([facility] [address] [J96.01, J96.02]  Urinary tract infection in female [N39.0]  Pressure injury of skin of left hip, unspecified injury stage [L89.229]  Dementia with mood disturbance, unspecified dementia severity, unspecified dementia type [F03.93]  COVID-19 [U07.1]  Pain: Denies                                                       H&P: 81 y.o. female with PMHx of dementia, hypothyroidism, COPD who presented to [facility] with complaints of shortness of breath. Patient looks malnourished and is not really responsive on exam.  She reacts to pain, but does not follow commands. No family at bedside. Unable to get any history. Patient does have a history of dementia. History from the ER indicates patient was found to have positive COVID-19 test.  Unclear what duration she has been ill. She was hypoxic in the ER with oxygen saturation in the 80s on room air and was placed on supplemental oxygen. She does not use oxygen at baseline. Unable to obtain ROS given dementia and inability to participate in the exam.      Imaging:  Chest X-ray:  22  Impression       Lungs are clear     History/Prior Level of Function:   Living Status: [facility]  Prior Dysphagia History: Per chart review, pt is a total feed and on a mechanical soft diet with thin liquids at Replaced by [facility]. Reason for referral: SLP evaluation orders received due to altered mental status    Dysphagia Impressions/Diagnosis: Oropharyngeal Dysphagia   Pt alert and repositioned upright in bed for evaluation. Despite repositioning, pt continues with slight hyperextension of neck across trials.  Initially, minimal verbal output was assessed. Pt unable to follow commands which remained consistent across session therefore oral motor exam unable to be completed. As session progressed, pt with increase in verbalizations including some confabulatory responses but otherwise appropriate answers to simple, yes/no questions. Pt presents with a very dry but clean oral cavity, secondary to open mouth position. Ice chips, thin liquids and puree solids provided to assess swallow function. Feed assist provided with all trials. Oral phase characterized by intermittent oral acceptance with improved bolus seal around tsp/straw as trials progressed, prolonged oral manipulation, decreased AP transit, suspected pharyngeal pooling and sufficient oral clearance. Pt responsive to tactile stimulation via straw for intake of thin liquids. Decreased hyolaryngeal elevation assessed upon manual palpation. Thin liquids revealed a mildly delayed swallow initiation. Intermittent belching noted across trials however no overt s/s of aspiration were present. Overall, pt is at increased risk of aspiration due to cognitive status and inability to self feed. Recommend initiation of Dysphagia I/Puree with Thin Liquids, meds crushed as puree with 1:1 feed assist. Only feed when pt is fully ALERT and in upright positioning. If respiratory status were to decline, recommend downgrade to NPO with SLP to reassess. Recommended Diet and Intervention 12/23/2022:  Diet Solids Recommendation:  Dysphagia I Puree  Liquid Consistency Recommendation: Thin liquids  Recommended form of Meds: Meds crushed as able in puree         Compensatory Swallowing Strategies:  Upright as possible with all PO intake , Small bites/sips , Eat/feed slowly, Remain upright 30-45 min , Total Feed     SHORT TERM DYSPHAGIA GOALS/PLAN OF CARE: Speech therapy for dysphagia tx 3-5 times per week during acute care stay.     Pt will functionally tolerate recommended diet with no overt clinical s/s of aspiration   Pt will advance to least restrictive diet as indicated     Dysphagia Therapeutic Intervention:  Oral Care, Diet Tolerance Monitoring , Patient/Family Education , Therapeutic Trials with SLP     Discharge Recommendations: Discharge recommendations to be determined pending ongoing follow-up during acute care stay    Patient Positioning: Upright in bed     Current Diet Level (prior to evaluation): NPO        Respiratory Status:   []Room Air   [x]O2 via nasal cannula: 2L   []Other:    Dentition:  []Adequate  []Dentures   []Missing Many Teeth  [x]Edentulous  []Other:    Baseline Vocal Quality:  [x]Normal  []Dysphonic   []Aphonic   []Hoarse  []Wet  []Weak  []Other:    Volitional Cough:  Not Elicited     Volitional Swallow:   []Absent   []Delayed     []Adequate     []Required use of drink     Oral Mechanism Exam:  []WFL []Mild   [] Moderate  []Severe  [x]To be assessed  Impaired:   []Left side      []Right side    []Labial ROM/Coordination    []Labial Symmetry   []Lingual ROM/Coordination   []Lingual Symmetry  []Gag  []Other:     Oral Phase: []WFL []Mild   [x] Moderate  []Severe  []To be assessed   [x]Impaired/Prolonged Mastication:   []Oral Holding:   []Spillage Left:   []Spillage Right:  []Pocketing Left:   []Pocketing Right:   [x]Decreased Anterior to Posterior Transit:   [x]Suspected Premature Bolus Loss:   [x]Lingual/Palatal Residue:   []Other:     Pharyngeal Phase: []WFL [x]Mild   [] Moderate  []Severe  []To be assessed   [x]Delayed Swallow:   [x]Suspected Pharyngeal Pooling:   [x]Decreased Laryngeal Elevation:   []Absent Swallow:  []Wet Vocal Quality:   []Throat Clearing-Immediate:   []Throat Clearing-Delayed:   []Cough-Immediate:   []Cough-Delayed:  []Change in Vital Signs:  [x]Suspected Delayed Pharyngeal Clearing:  []Other:     Eating Assistance:  []Independent  []Setup or clean-up assistance   [] Supervision or touching assistance   [] Partial or moderate assistance   [] Substantial or maximal assistance  [x] Dependent     EDUCATION:   Provided education regarding role of SLP, results of assessment, recommendations and general speech pathology plan of care. [] Pt verbalized understanding and agreement   [] Pt requires ongoing learning   [x] No evidence of comprehension     If patient discharges prior to next visit, this note will serve as discharge.      Treatment time:  Timed Code Treatment Minutes: 0  Total Treatment time: 26 min    Electronically signed by:    Willam Arceo M.A., [address] [place] [address]  Speech-Language Pathologist

## 2022-12-23 NOTE — H&P
Hospital Medicine History & Physical        Name:  Holden Celaya  /Age/Sex: 1935  (81 y.o. female)  MRN & CSN:  [id number] & [id number]     PCP: No primary care provider on file. Date of Admission: 2022    Date of Service: Pt seen/examined on 2022    Patient Status:  Inpatient - Patient will most likely require more than 48 hours of treatment and requires intensive medical treatment/monitoring     Chief Complaint:    Chief Complaint   Patient presents with    Shortness of Breath    Positive For Covid-19    Other     FROM [employer]. COUGH   ALSO PAINFUL BEDSORE ON BACKSIDE         History Of Present Illness:     81 y.o. female with PMHx of dementia, hypothyroidism, COPD who presented to [facility] with complaints of shortness of breath. Patient looks malnourished and is not really responsive on exam.  She reacts to pain, but does not follow commands. No family at bedside. Unable to get any history. Patient does have a history of dementia. History from the ER indicates patient was found to have positive COVID-19 test.  Unclear what duration she has been ill. She was hypoxic in the ER with oxygen saturation in the 80s on room air and was placed on supplemental oxygen. She does not use oxygen at baseline. Unable to obtain ROS given dementia and inability to participate in the exam.    Past Medical History:          Diagnosis Date    COPD (chronic obstructive pulmonary disease) ([facility] [address]     Dementia ([facility] [address]     Diverticulosis     Glaucoma     Hypertension     Hypothyroid     Legal blindness        Past Surgical History:          Procedure Laterality Date    EYE SURGERY      JOINT REPLACEMENT      TONSILLECTOMY         Medications Prior to Admission:      Prior to Admission medications    Medication Sig Start Date End Date Taking?  Authorizing Provider   levothyroxine (SYNTHROID) 112 MCG tablet Take 112 mcg by mouth Daily    Historical Provider, MD melatonin 3 MG TABS tablet Take 6 mg by mouth nightly as needed    Historical Provider, MD   polyethylene glycol (GLYCOLAX) 17 g packet Take 17 g by mouth daily as needed for Constipation    Historical Provider, MD   QUEtiapine (SEROQUEL) 25 MG tablet Take 25 mg by mouth nightly    Historical Provider, MD   QUEtiapine (SEROQUEL) 25 MG tablet Take 12.5 mg by mouth every 6 hours as needed for Agitation    Historical Provider, MD   tamsulosin (FLOMAX) 0.4 MG capsule Take 0.4 mg by mouth nightly    Historical Provider, MD   vitamin E 400 UNIT capsule Take 400 Units by mouth daily    Historical Provider, MD   oxyCODONE-acetaminophen (PERCOCET)  MG per tablet Take 1 tablet by mouth in the morning and 1 tablet in the evening. Historical Provider, MD   miconazole nitrate 2 % OINT Apply topically 2 times daily    Historical Provider, MD   albuterol sulfate HFA (VENTOLIN HFA) 108 (90 Base) MCG/ACT inhaler Inhale 2 puffs into the lungs as needed for Wheezing    Historical Provider, MD   solifenacin (VESICARE) 5 MG tablet Take 5 mg by mouth daily    Historical Provider, MD   NIFEdipine (PROCARDIA XL) 60 MG extended release tablet Take 60 mg by mouth daily    Historical Provider, MD       Allergies:  Patient has no known allergies. Social History:      TOBACCO:   reports that she has quit smoking. Her smoking use included cigarettes. She has a 10.00 pack-year smoking history. She does not have any smokeless tobacco history on file. ETOH:   reports no history of alcohol use. E-cigarette/Vaping       Questions Responses    E-cigarette/Vaping Use     Start Date     Passive Exposure     Quit Date     Counseling Given     Comments               Family History:      Reviewed and negative in regards to presenting illness/complaint. Problem Relation Age of Onset    Coronary Art Dis Father        REVIEW OF SYSTEMS COMPLETED:   Pertinent positives as noted in the HPI.  All other systems reviewed and negative. PHYSICAL EXAM PERFORMED:    BP (!) 119/58   Pulse 92   Temp 96.8 °F (36 °C) (Axillary)   Resp 19   SpO2 98%     General appearance:  No apparent distress, appears stated age and cooperative. Cachectic. Chronically ill-appearing. HEENT:  Normal cephalic, atraumatic without obvious deformity. Pupils equal, round, and reactive to light. Extra ocular muscles intact. Conjunctivae/corneas clear. Nasal cannula present. Neck: Supple. No jugular venous distention. Trachea midline. Respiratory:  Normal respiratory effort. Slight crackles noted bilaterally. Cardiovascular:  Regular rate and rhythm with normal S1/S2 without murmurs, rubs or gallops. No peripheral edema. Abdomen: Soft, non-tender, non-distended with normal bowel sounds. : No CVA tenderness  Musculoskeletal:  No clubbing or cyanosis. No gross deformities. .  Skin: Skin color, texture, turgor normal.  No rashes or lesions. Neurologic:  Neurovascularly intact without any focal sensory/motor deficits. Cranial nerves: II-XII intact, grossly non-focal.  Psychiatric:  Alert. Does not answer questions. Responds to pain. Peripheral Pulses: +2 palpable, equal bilaterally       Labs:     Recent Labs     12/22/22 1844   WBC 15.3*   HGB 12.0   HCT 37.4        Recent Labs     12/22/22 1844   *   K 4.7      CO2 29   BUN 42*   CREATININE 1.0   CALCIUM 9.3     Recent Labs     12/22/22 1844   AST 14*   ALT 6*   BILITOT 0.4   ALKPHOS 62     No results for input(s): INR in the last 72 hours.   Recent Labs     12/22/22 1844   TROPONINI 0.09*       Urinalysis:      Lab Results   Component Value Date/Time    NITRU Negative 12/22/2022 06:44 PM    WBCUA 10-20 12/22/2022 06:44 PM    BACTERIA 2+ 12/22/2022 06:44 PM    RBCUA 0-2 12/22/2022 06:44 PM    BLOODU TRACE-INTACT 12/22/2022 06:44 PM    SPECGRAV 1.015 12/22/2022 06:44 PM    GLUCOSEU Negative 12/22/2022 06:44 PM       Radiology:     CXR: I have reviewed the CXR with the following interpretation: nonacute  EKG:  I have reviewed the EKG with the following interpretation: NSR    XR CHEST PORTABLE   Final Result      Lungs are clear             Medications:  Not in a hospital admission.   Current Facility-Administered Medications   Medication Dose Route Frequency Provider Last Rate Last Admin    [START ON 12/23/2022] cefTRIAXone (ROCEPHIN) 1,000 mg in dextrose 5 % 50 mL IVPB mini-bag  1,000 mg IntraVENous Q24H Archie Terlep, DO        albuterol sulfate HFA (PROVENTIL;VENTOLIN;PROAIR) 108 (90 Base) MCG/ACT inhaler 2 puff  2 puff Inhalation PRN Brian Laughter, DO        [START ON 12/23/2022] levothyroxine (SYNTHROID) tablet 112 mcg  112 mcg Oral Daily Archie Terlep, DO        [START ON 12/23/2022] melatonin tablet 6 mg  6 mg Oral Nightly PRN Brian Laughter, DO        oxyCODONE-acetaminophen (PERCOCET)  MG per tablet 1 tablet  1 tablet Oral Q12H Archie Terlep, DO        QUEtiapine (SEROQUEL) tablet 25 mg  25 mg Oral Nightly Archie Terlep, DO        tamsulosin (FLOMAX) capsule 0.4 mg  0.4 mg Oral Nightly Archie Terlep, DO        sodium chloride flush 0.9 % injection 5-40 mL  5-40 mL IntraVENous 2 times per day Brian Laughter, DO        sodium chloride flush 0.9 % injection 5-40 mL  5-40 mL IntraVENous PRN Archie Terlep, DO        0.9 % sodium chloride infusion   IntraVENous PRN Brian Laughter, DO        [START ON 12/23/2022] enoxaparin (LOVENOX) injection 40 mg  40 mg SubCUTAneous Daily Archie Terlep, DO        ondansetron (ZOFRAN-ODT) disintegrating tablet 4 mg  4 mg Oral Q8H PRN Brian Laughter, DO        Or    ondansetron (ZOFRAN) injection 4 mg  4 mg IntraVENous Q6H PRN Brian Laughter, DO        polyethylene glycol (GLYCOLAX) packet 17 g  17 g Oral Daily PRN Brian Laughter, DO        acetaminophen (TYLENOL) tablet 650 mg  650 mg Oral Q6H PRN Brian Laughter, DO        Or    acetaminophen (TYLENOL) suppository 650 mg  650 mg Rectal Q6H PRN Brian Laughter, DO        lactated ringers infusion IntraVENous Continuous Brian Laughter, DO        magnesium sulfate 2000 mg in 50 mL IVPB premix  2,000 mg IntraVENous PRN Brian Laughter, DO        potassium chloride (KLOR-CON M) extended release tablet 40 mEq  40 mEq Oral PRN Brian Laughter, DO        Or    potassium bicarb-citric acid (EFFER-K) effervescent tablet 40 mEq  40 mEq Oral PRN Brian Laughter, DO        Or    potassium chloride 10 mEq/100 mL IVPB (Peripheral Line)  10 mEq IntraVENous PRN Brian Laughter, DO         Current Outpatient Medications   Medication Sig Dispense Refill    levothyroxine (SYNTHROID) 112 MCG tablet Take 112 mcg by mouth Daily      melatonin 3 MG TABS tablet Take 6 mg by mouth nightly as needed      polyethylene glycol (GLYCOLAX) 17 g packet Take 17 g by mouth daily as needed for Constipation      QUEtiapine (SEROQUEL) 25 MG tablet Take 25 mg by mouth nightly      QUEtiapine (SEROQUEL) 25 MG tablet Take 12.5 mg by mouth every 6 hours as needed for Agitation      tamsulosin (FLOMAX) 0.4 MG capsule Take 0.4 mg by mouth nightly      vitamin E 400 UNIT capsule Take 400 Units by mouth daily      oxyCODONE-acetaminophen (PERCOCET)  MG per tablet Take 1 tablet by mouth in the morning and 1 tablet in the evening.       miconazole nitrate 2 % OINT Apply topically 2 times daily      albuterol sulfate HFA (VENTOLIN HFA) 108 (90 Base) MCG/ACT inhaler Inhale 2 puffs into the lungs as needed for Wheezing      solifenacin (VESICARE) 5 MG tablet Take 5 mg by mouth daily      NIFEdipine (PROCARDIA XL) 60 MG extended release tablet Take 60 mg by mouth daily         Consults:    IP CONSULT TO HOSPITALIST  IP CONSULT TO DIETITIAN    ASSESSMENT:    Active Hospital Problems    Diagnosis Date Noted    (HFpEF) heart failure with preserved ejection fraction ([facility] [address] [I50.30] 12/22/2022     Priority: Medium    Sepsis ([address] [A41.9] 12/22/2022     Priority: Medium    Acute respiratory failure with hypoxia ([facility] [address] [J96.01] 12/22/2022     Priority: Medium    Dementia [facility]) [F03.90] 12/22/2022     Priority: Medium    COPD (chronic obstructive pulmonary disease) ([facility] [address] [J44.9] 12/22/2022     Priority: Medium    COVID-19 [U07.1] 12/22/2022     Priority: Medium    HTN (hypertension) [I10] 09/27/2022     Priority: Medium    Hypothyroidism [E03.9] 09/27/2022     Priority: Medium    UTI (urinary tract infection) [N39.0] 09/26/2022     Priority: Medium         PLAN:  This is a 81 y.o. female who presented to [facility] and is being treated for:    Sepsis  -Tachycardic, tachypneic, leukocytosis on admission  -Blood cultures ordered; results pending  -UA indicative of UTI; urine cultures pending  -Pro-Virgilio not elevated  -Rocephin  -IVF  -Daily labs; replace electrolytes as needed    UTI  -UA indicative of UTI  -Antibiotics as above    Acute respiratory failure with hypoxia  COVID  -CXR nonacute  -COVID-positive at nursing home; repeat  -Requiring supplemental oxygen; wean oxygen as able  -Inhaler therapies  -Keep oxygen saturations above 92%  -ABG    HFpEF  -Echo 7/1/2013 shows LVEF 50-55% with hypokinesis of the basal inferior myocardium and G1 DD  -Not on any diuretics  -BNP 1458; monitor  -IVF  -I/Os    COPD  -Continue home inhalers        DVT ppx: Lovenox  GI ppx: Diet/Tube Feeds  Diet: ADULT DIET; Dysphagia - Soft and Bite Sized  Code Status: Full Code    PT/OT Eval Status: Ordered    Disposition -back to facility after medical stabilization and treatment       Kiana Posada DO  12/22/2022  9:30 PM    Please note that some part of this chart was generated using Dragon dictation software. Although every effort was made to ensure the accuracy of this automated transcription, some errors in transcription may have occurred inadvertently. If you may need any clarification, please do not hesitate to contact me through [facility].

## 2022-12-23 NOTE — PROGRESS NOTES
[facility]   Phone: [phone]    Occupational Therapy    [x] Initial Evaluation            [] Daily Treatment Note         [x] Discharge Summary      Patient: Daryle Seeds   : 1935   MRN: [id number]   Date of Service:  2022    Admitting Diagnosis:  Sepsis [facility])  Current Admission Summary: 81 y.o. female with PMHx of dementia, hypothyroidism, COPD who presented to [facility] with complaints of shortness of breath. Patient looks malnourished and is not really responsive on exam.  She reacts to pain, but does not follow commands. No family at bedside. Unable to get any history. Patient does have a history of dementia. History from the ER indicates patient was found to have positive COVID-19 test.  Unclear what duration she has been ill. She was hypoxic in the ER with oxygen saturation in the 80s on room air and was placed on supplemental oxygen. She does not use oxygen at baseline. Unable to obtain ROS given dementia and inability to participate in the exam.  Past Medical History:  has a past medical history of COPD (chronic obstructive pulmonary disease) ([facility] [address] Dementia ([facility] [address] Diverticulosis, Glaucoma, Hypertension, Hypothyroid, and Legal blindness. Past Surgical History:  has a past surgical history that includes eye surgery; Tonsillectomy; and joint replacement. Discharge Recommendations: No OT warranted at d/c as pt is at baseline. DME Required For Discharge: no DME required at discharge    Precautions/Restrictions: high fall risk, Isolation precautions, up as tolerated    Pre-Admission Information   Pt is from [facility]. At baseline, pt is total assistance for all ADLs and iADLs. Pt requires assistance for bed mobility and transfers.     Examination   Vision:   CAROLINA d/t cog  Hearing:   WFL  Observation:   General Observation:  pressure sores throughout body, followed by wound care, dry skin on feet, lips, and mouth dry, crying periodically throughout  Posture: Forward head, rounded shoulders   Sensation:   CAROLINA d/t cog  ROM:   (B) Elbow AROM WFL  (B) Wrist AROM WFL  Strength:   Formal MMT held secondary to cognition  Therapist Clinical Decision Making (Complexity): low complexity  Clinical Presentation: stable    Subjective  General: Pt is supine in bed, on RA upon OT arrival. Carlson catheter in place. Pain: Patient does not rate upon questioning  Pain Interventions: not applicable      Activities of Daily Living  Basic Activities of Daily Living  Feeding: requires verbal cueing Increased time to complete task requires assistance for beverage management pureed diet  Feeding Comments: coughing after every sip of water, RN aware  Toileting: dependent. Toileting Comments: carlson catheter  Functional Mobility  Bed Mobility  Supine to Sit: dependent assistance, 2 person assistance with max Ax2   Sit to Supine: dependent assistance, 2 person assistance with max Ax2   Scooting: dependent assistance, 2 person assistance with max Ax2   Comments:unable to follow one step directions for bed mobility, crying during movement  Transfers  No transfers completed on this date secondary to cognition and safety concerns. Functional Mobility:  Sitting Balance: contact guard assistance, moderate assistance. Sitting Balance Comment: initially mod A progressing to CGA while seated on EOB ~10 minutes  No functional mobility completed on this date secondary to nonambulatory at baseline.   Functional Outcomes  AM-PAC Inpatient Daily Activity Raw Score: 6    Cognition  Overall Cognitive Status: Impaired  Following Commands: inconsistently follows commands  Attention Span: unable to maintain attention  Memory: decreased recall of biographical information, decreased recall of precautions, decreased recall of recent events, decreased short term memory, decreased long term memory  Safety Judgement: decreased awareness of need for assistance, decreased awareness of need for safety  Problem Solving: assistance required to generate solutions, assistance required to implement solutions, decreased awareness of errors, assistance required to identify errors made, assistance required to correct errors made  Insights: not aware of deficits  Initiation: requires cues for all  Sequencing: requires cues for all  Comments: crying and scared periodically  Orientation:    oriented to person, disoriented to place, disoriented to time , and disoriented to situation  Command Following:   impaired  Education  Barriers To Learning: cognition and emotional  Patient Education: patient educated on plan of care, precautions, discharge recommendations  Learning Assessment:  patient will require reinforcement due to cognitive deficits  Assessment  Activity Tolerance: Poor d/t cognition and pain  Impairments Requiring Therapeutic Intervention: none - eval with same day discharge  Prognosis: fair  Clinical Assessment: Pt is currently at baseline. No OT warranted during this acute stay. Safety Interventions: patient left in bed, bed alarm in place, call light within reach, patient at risk for falls, and nurse notified    Plan  Frequency: Eval with same day discharge. No follow up required.     Therapy Session Time   Individual Group Co-treatment   Time In    1501   Time Out    1532   Minutes    31      Timed Code Treatment Minutes:  Timed Code Treatment Minutes: 16 Minutes    Total Treatment Minutes:  31     Electronically Signed By: Gregorio Del Angel, [id number] [address], OTR/L, [id number]

## 2022-12-23 NOTE — CARE COORDINATION
Discharge Planning Assessment  Readmission Score of 17%    Demographics/Insurance verified Yes    Current type of dwelling: [facility] ([facility])  [address] [place]  [address]  Phone: [phone]  Fax: [phone]     Patient from ECF/SW confirmed with: Confirmed with Bhumi     Living arrangements: LTC    Tentative discharge plan: Return to the [facility].     Discussed and provided facilities of choice if transition to a skilled nursing facility is required at the time of discharge      Transportation at the time of discharge: Medical Transport.       Electronically signed by NILDA Sun on 12/23/2022 at 3:39 PM

## 2022-12-23 NOTE — PROGRESS NOTES
[facility] PROGRESS NOTE    12/23/2022 10:18 AM        Name: Sayra Watters . Admitted: 12/22/2022  Primary Care Provider: No primary care provider on file. (Tel: None)                        Subjective:  . No acute events overnight. Resting well. Pain control. Diet ok. Labs reviewed  Denies any chest pain sob.      Reviewed interval ancillary notes    Current Medications  cefTRIAXone (ROCEPHIN) 1,000 mg in dextrose 5 % 50 mL IVPB mini-bag, Q24H  albuterol sulfate HFA (PROVENTIL;VENTOLIN;PROAIR) 108 (90 Base) MCG/ACT inhaler 2 puff, PRN  levothyroxine (SYNTHROID) tablet 112 mcg, Daily  melatonin tablet 6 mg, Nightly PRN  QUEtiapine (SEROQUEL) tablet 25 mg, Nightly  tamsulosin (FLOMAX) capsule 0.4 mg, Nightly  sodium chloride flush 0.9 % injection 5-40 mL, 2 times per day  sodium chloride flush 0.9 % injection 5-40 mL, PRN  0.9 % sodium chloride infusion, PRN  enoxaparin (LOVENOX) injection 40 mg, Daily  ondansetron (ZOFRAN-ODT) disintegrating tablet 4 mg, Q8H PRN   Or  ondansetron (ZOFRAN) injection 4 mg, Q6H PRN  polyethylene glycol (GLYCOLAX) packet 17 g, Daily PRN  acetaminophen (TYLENOL) tablet 650 mg, Q6H PRN   Or  acetaminophen (TYLENOL) suppository 650 mg, Q6H PRN  lactated ringers infusion, Continuous  magnesium sulfate 2000 mg in 50 mL IVPB premix, PRN  potassium chloride (KLOR-CON M) extended release tablet 40 mEq, PRN   Or  potassium bicarb-citric acid (EFFER-K) effervescent tablet 40 mEq, PRN   Or  potassium chloride 10 mEq/100 mL IVPB (Peripheral Line), PRN        Objective:  BP (!) 162/68   Pulse 88   Temp 97.6 °F (36.4 °C)   Resp 16   Ht 5' 4\" (1.626 m)   Wt 95 lb 8 oz (43.3 kg)   SpO2 93%   BMI 16.39 kg/m²     Intake/Output Summary (Last 24 hours) at 12/23/2022 1018  Last data filed at 12/23/2022 0954  Gross per 24 hour   Intake 448.06 ml   Output 475 ml   Net -26.94 ml      Wt Readings from Last 3 Encounters:   12/23/22 95 lb 8 oz (43.3 kg)   09/30/22 128 lb 4.9 oz (58.2 kg)       General appearance:  Appears comfortable  Eyes: Sclera clear. Pupils equal.  ENT: Moist oral mucosa. Trachea midline, no adenopathy, neck supple. Cardiovascular: Regular rhythm, normal S1, S2. No murmur. No edema in lower extremities  Respiratory: Not using accessory muscles. Good inspiratory effort. Clear to auscultation bilaterally, no wheeze or crackles. GI: Abdomen soft, no tenderness, not distended, normal bowel sounds  Musculoskeletal: No deformity, ROM WNL. Neurology: CN 2-12 grossly intact. No speech or motor deficits  Psych: Normal affect. Alert and oriented in time, place and person  Skin: Warm, dry, normal turgor    Labs and Tests:  CBC:   Recent Labs     12/22/22  1844 12/23/22  0617   WBC 15.3* 15.3*   HGB 12.0 10.8*   HCT 37.4 33.3*   MCV 93.4 90.5    247     BMP:    Recent Labs     12/22/22  1844 12/23/22  0617   * 148*   K 4.7 4.5    112*   CO2 29 25   BUN 42* 44*   CREATININE 1.0 1.0   GLUCOSE 89 168*     Hepatic:   Recent Labs     12/22/22  1844   AST 14*   ALT 6*   BILITOT 0.4   ALKPHOS 62         Problem List  Principal Problem:    Sepsis ([facility] [address]  Active Problems:    UTI (urinary tract infection)    HTN (hypertension)    Hypothyroidism    (HFpEF) heart failure with preserved ejection fraction (HCC)    Acute respiratory failure with hypoxia (HCC)    Dementia (HCC)    COPD (chronic obstructive pulmonary disease) ([facility] [address]    COVID-19  Resolved Problems:    * No resolved hospital problems. *       Assessment & Plan:,   Sepsis: Fever and leukocytosis noted, blood cultures pending, continue empiric ceftriaxone.   COVID-19: Continue contact and droplet precautions, supplemental oxygen as needed, continue inhaler treatment, not on dexamethasone, currently on 2L per NC  Hypernatremia: will start D5W and monitor BMP   Compensated diastolic heart failure: proBNP 1450, chest x-ray clear showed no congestion. No peripheral edema noted  Pyuria: Continue ceftriaxone, urine culture pending  Deconditioning: PT OT consulted, speech therapy recommending puréed diet      Diet: ADULT DIET;  Dysphagia - Pureed  Code:Full Code  DVT PPX lovenox       Jung Arteaga MD   12/23/2022 10:18 AM

## 2022-12-23 NOTE — CARE COORDINATION
[id number] [facility] Wound Ostomy Continence Nurse  Follow-up Progress Note       NAME:  Rico Solomon  MEDICAL RECORD NUMBER:  [id number]  AGE:  81 y.o. GENDER:  female  :  1935  TODAY'S DATE:  2022    Subjective:   Wound Identification:  Wound Type: pressure  Contributing Factors: chronic pressure, decreased mobility, and malnutrition        Patient Goal of Care:  [] Wound Healing  [] Odor Control  [] Palliative Care  [] Pain Control   [] Other:     Objective:    BP (!) 161/58   Pulse 57   Temp 97.4 °F (36.3 °C) (Axillary)   Resp 16   Ht 5' 4\" (1.626 m)   Wt 95 lb 8 oz (43.3 kg)   SpO2 99%   BMI 16.39 kg/m²   Dario Risk Score: Dario Scale Score: 12  Assessment:   Measurements:  Wound 22 Hip Left eschar noted (Active)   Wound Image   22 1333   Wound Etiology Pressure Unstageable 22 1333   Dressing Status New dressing applied 22 1333   Wound Cleansed Cleansed with saline 22 1333   Dressing/Treatment Hydrofera blue; Foam 22 1333   Dressing Change Due 22 1333   Wound Length (cm) 4.2 cm 22 1333   Wound Width (cm) 4 cm 22 1333   Wound Surface Area (cm^2) 16.8 cm^2 22 1333   Wound Assessment Eschar dry 22 1333   Drainage Amount None 22 0825   Odor None 22 0825   Joceline-wound Assessment Non-blanchable erythema; Intact 22 1333   Number of days: 0       Wound 22 Heel Right (Active)   Wound Image   22 1333   Dressing Status New dressing applied 22 1333   Wound Cleansed Cleansed with saline 22 1333   Dressing/Treatment Foam 22 1333   Offloading for Diabetic Foot Ulcers Offloading boot 22 1333   Dressing Change Due 22 1333   Wound Length (cm) 3 cm 22 1333   Wound Width (cm) 2 cm 22 1333   Wound Surface Area (cm^2) 6 cm^2 22 1333   Wound Assessment Eschar dry 22 1333   Drainage Amount None 22 0825   Odor None 22 0825   Joceline-wound Assessment Intact; Blanchable erythema 12/23/22 1333   Number of days: 0       Wound 12/22/22 Heel Left (Active)   Wound Image   12/23/22 1333   Wound Etiology Pressure Stage 2 12/23/22 1333   Dressing Status Clean;Dry; Intact 12/23/22 0825   Wound Cleansed Cleansed with saline 12/23/22 1333   Dressing/Treatment Foam 12/23/22 1333   Offloading for Diabetic Foot Ulcers Offloading boot 12/23/22 1333   Dressing Change Due 12/25/22 12/23/22 1333   Wound Length (cm) 3 cm 12/23/22 1333   Wound Width (cm) 2 cm 12/23/22 1333   Wound Surface Area (cm^2) 6 cm^2 12/23/22 1333   Wound Assessment Pink/red 12/23/22 1333   Drainage Amount None 12/23/22 0825   Odor None 12/23/22 0825   Joceline-wound Assessment Blanchable erythema 12/23/22 1333   Wound Thickness Description not for Pressure Injury Partial thickness 12/23/22 1333   Number of days: 0       Wound 12/22/22 Foot Anterior;Right;Lateral unstageable  lateral right foot (Active)   Wound Image   12/23/22 1339   Wound Etiology Pressure Unstageable 12/23/22 1339   Dressing Status New dressing applied 12/23/22 1339   Dressing/Treatment Foam 12/23/22 1339   Wound Length (cm) 3 cm 12/23/22 1339   Wound Width (cm) 2 cm 12/23/22 1339   Wound Surface Area (cm^2) 6 cm^2 12/23/22 1339   Wound Assessment Eschar dry 12/23/22 1339   Joceline-wound Assessment Non-blanchable erythema 12/23/22 1339   Margins Attached edges; Defined edges 12/23/22 1339   Number of days: 0       Wound 12/22/22 Foot Anterior;Left;Lateral lateral left foot DTI (Active)   Wound Image   12/23/22 1339   Wound Etiology Pressure Unstageable 12/23/22 1339   Dressing Status New dressing applied 12/23/22 1339   Wound Cleansed Cleansed with saline 12/23/22 1339   Dressing/Treatment Foam 12/23/22 1339   Wound Length (cm) 3 cm 12/23/22 1339   Wound Width (cm) 2 cm 12/23/22 1339   Wound Surface Area (cm^2) 6 cm^2 12/23/22 1339   Wound Assessment Pink/red 12/23/22 1339   Drainage Amount None 12/23/22 133 Royal-wound Assessment Non-blanchable erythema 12/23/22 1339   Number of days: 0       Wound 12/22/22 Foot Anterior; Left dti top of left foot (Active)   Wound Image   12/23/22 1339   Wound Etiology Deep tissue/Injury 12/23/22 1339   Wound Cleansed Cleansed with saline 12/23/22 1339   Dressing/Treatment Foam 12/23/22 1339   Offloading for Diabetic Foot Ulcers Offloading boot 12/23/22 1339   Wound Length (cm) 1.5 cm 12/23/22 1339   Wound Width (cm) 4.5 cm 12/23/22 1339   Wound Surface Area (cm^2) 6.75 cm^2 12/23/22 1339   Wound Assessment Pink/red 12/23/22 1339   Drainage Amount None 12/23/22 1339   Royal-wound Assessment Intact 12/23/22 1339   Margins Attached edges; Defined edges 12/23/22 1339   Number of days: 0   Patient seen for wounds present  on admission. Patient is from nursing home. Patient is under weight. With  help from patient care assistant was able to assess wounds. Patient has wound to  left hip covered with eschar, royal wound is red with peeling skin. Recommend hydrofera blue dressing. Dressing is antimicrobial.  Wound to left heel is a stage 2, pink/red. Will use foam dressing. Wound to right heel is covered with eschar, royal wound has blanchable erythema. There is also a deep tissue injury to top of left foot. Will cover with foam dressing. Lateral right foot has an ustageable wound surrounded by non-blanchable erythema. Patient does have heel protectors on; patient came with these. Recommend placing foam dressings on all bony prominences and will order a Specialty bed. Lateral side of right foot    Top of left foot    Lateral left foot    Deep tissue injury left heel    Left heel    Right heel  Response to treatment:  Well tolerated by patient.      Pain Assessment:  Severity:  0 / 10  Quality of pain: N/A  Wound Pain Timing/Severity: none  Premedicated: No  Plan:   Plan of Care:  Specialty Bed Required : Yes   [x] Low Air Loss   [] Pressure Redistribution  [] Fluid Immersion  [] Bariatric  [] Total Pressure Relief  [] Other:     Current Diet: ADULT DIET;  Dysphagia - Pureed  Dietician consult:  Yes    Discharge Plan:  Placement for patient upon discharge: skilled nursing   Patient appropriate for Outpatient [address]: Yes    Referrals:  []   [] [facility]  [] Supplies  [] Other    Patient/Caregiver Teaching:  Level of patient/caregiver understanding able to:   [] Indicates understanding       [] Needs reinforcement  [] Unsuccessful      [] Verbal Understanding  [] Demonstrated understanding       [x] No evidence of learning  [] Refused teaching         [] N/A       Electronically signed by  STANFORD Mccracken, RN, [address] [place] [address]  [phone] on 12/23/2022 at 1:50 PM

## 2022-12-23 NOTE — PROGRESS NOTES
Nutrition Note    RECOMMENDATIONS  PO Diet: NPO; advance per SLP recommendations  ONS: will begin appropriate ONS once PO diet initiated  Nutrition Support: if unsafe for PO diet, recommend to consult RD for tube feed order and manage. NUTRITION ASSESSMENT   Pt is nutritionally compromised AEB admission MST score of 2, indicating wt loss and poor po intake prior to admission. Pt with increased protein needs r/t wounds noted to coccyx and bilat heels. Per chart review pt has lost 26 lb / 20% over past 3 months. She was 128 lb on 9/28 and admission wt was 102 lb on 12/22. Currently NPO, per nursing documentation pt is on a mechanical soft (dysphagia minced and moist) at [facility]. SLP consult has been ordered. Pt has bilateral hand contractures, she is a total feed. Once PO diet initiated will begin oral nutrition supplement. Pt is full code, if unable to adv PO diet, recommend to consult RD for tube feed order and manage.      Nutrition Related Findings: LR at 100 mL/hr; Na+ 148, Mg 1.70; contractures: R &L hand, R & L food  Wounds: Multiple (coccyx and bilateral heels)  Nutrition Education:  Education not indicated   Nutrition Goals: Initiate PO diet     MALNUTRITION ASSESSMENT   Chronic Illness  Malnutrition Status: Insufficient data (at risk)  Findings of the 6 clinical characteristics of malnutrition:  Energy Intake:  Unable to assess  Weight Loss:  Greater than 7.5% over 3 months     Body Fat Loss:  Unable to assess     Muscle Mass Loss:  Unable to assess    Fluid Accumulation:  No significant fluid accumulation     Strength:  Not Performed      NUTRITION DIAGNOSIS   Inadequate oral intake related to swallowing difficulty as evidenced by NPO or clear liquid status due to medical condition  Increased nutrient needs related to increase demand for energy/nutrients as evidenced by wounds      CURRENT NUTRITION THERAPIES  Diet NPO     PO Intake: NPO   PO Supplement Intake:NPO      ANTHROPOMETRICS  Current Height: 5' 4\" (162.6 cm)  Current Weight: 95 lb 8 oz (43.3 kg)    Ideal Body Weight (IBW): 120 lbs  (55 kg)        BMI: 16.3      COMPARATIVE STANDARDS  Energy (kcal):  1290 - 1505     Protein (g):  65 - 86       Fluid (mL/day):  1290 - 1505    The patient will be monitored per nutrition standards of care. Consult dietitian if additional nutrition interventions are needed prior to RD reassessment.      Steph Mcallister RD, LD    Contact: [phone]

## 2022-12-23 NOTE — CARE COORDINATION
Specialty Bed ordered.   [address], DASHAWNN, RN, [address] [place] [address]  [phone]

## 2022-12-23 NOTE — ED NOTES
Report given to MAHESH NOBLE [facility]     Josefina Davies RN  12/22/22 [id number]

## 2022-12-23 NOTE — PROGRESS NOTES
[facility]   Phone: [phone]    Physical Therapy    [x] Initial Evaluation            [] Daily Treatment Note         [x] Discharge Summary      Patient: Porter Sanford   : 1935   MRN: [id number]   Date of Service:  2022  Admitting Diagnosis: Sepsis [facility])  Current Admission Summary: 81 y.o. female with PMHx of dementia, hypothyroidism, COPD who presented to [facility] with complaints of shortness of breath. Patient looks malnourished and is not really responsive on exam. She reacts to pain, but does not follow commands. No family at bedside. Unable to get any history. Patient does have a history of dementia. History from the ER indicates patient was found to have positive COVID-19 test. Unclear what duration she has been ill. She was hypoxic in the ER with oxygen saturation in the 80s on room air and was placed on supplemental oxygen. She does not use oxygen at baseline. Past Medical History:  has a past medical history of COPD (chronic obstructive pulmonary disease) ([address] Dementia ([address] Diverticulosis, Glaucoma, Hypertension, Hypothyroid, and Legal blindness. Past Surgical History:  has a past surgical history that includes eye surgery; Tonsillectomy; and joint replacement. Discharge Recommendations: Porter Sanford scored a 6/24 on the AM-PAC short mobility form. At this time, no further PT is recommended upon discharge as pt is presenting at her baseline function. Recommend patient returns to prior setting with prior services. DME Required For Discharge: no DME required at discharge  Precautions/Restrictions: high fall risk, contact precautions, Isolation precautions- Droplet plus precautions  Weight Bearing Restrictions: no restrictions  Required Braces/Orthotics: no braces required  Positional Restrictions:no positional restrictions    Pre-Admission Information   Pt is from [facility].  At baseline, pt is total assistance for all ADLs and iADLs. Pt requires assistance for bed mobility and transfers. Examination   Vision:   Unable to formally test secondary to cognition. Hearing:   WFL  Observation:   General Observation:  Pt on RA on arrival.  Posture: Moderate forward head, rounded shoulders, and increased thoracic kyphosis in sitting and standing. Sensation:   Unable to formally test secondary to cognition. ROM:   Unable to formally assess due to cognition. On observation, pt appears to have B hip and knee flexion contractures. Strength:   Formal MMT held secondary to cognitive. Therapist Clinical Decision Making (Complexity): low complexity  Clinical Presentation: evolving      Subjective  General: Pt supine in bed on arrival, mumbling. Pt verbal throughout the initial evaluation however did not hold meaningful conversation. Perseverating on wanting a drink of water. Pt was given a drink of water and began coughing. RN notified. Pain: Pain rating taken based on observed faces and behaviors  Pain Interventions: not applicable and repositioned      Functional Mobility  Bed Mobility  Supine to Sit: 2 person assistance with dependent A   Sit to Supine: 2 person assistance with dependent A   Scootin person assistance with dependent A   Comments:  Transfers  No transfers completed on this date secondary to cognition and safety concerns. Comments:  Ambulation  Ambulation not tested on this date secondary to cognition and safety concerns. Comments:    Stair Mobility  Stair mobility not completed on this date. Comments:  Wheelchair Mobility:  No w/c mobility completed on this date.   Comments:  Balance  Static Sitting Balance: fair (-): maintains balance at CGA with use of UE support  Comments:    Other Therapeutic Interventions    Functional Outcomes  AM-PAC Inpatient Mobility Raw Score : 6              Cognition  Overall Cognitive Status: Impaired  Arousal/Alterness: inconsistent responses to stimuli  Following Commands: does not follow commands  Attention Span: difficulty attending to directions, difficulty dividing attention  Memory: decreased recall of recent events, decreased short term memory  Safety Judgement: decreased awareness of need for assistance, decreased awareness of need for safety  Problem Solving: assistance required to generate solutions, assistance required to implement solutions, decreased awareness of errors, assistance required to identify errors made, assistance required to correct errors made  Insights: not aware of deficits  Initiation: requires cues for all  Sequencing: requires cues for all  Orientation:    oriented to person, oriented to time, disoriented to place, and disoriented to situation  Command Following:   impaired- pt does not follow commands    Education  Barriers To Learning: cognition, emotional, and physical  Patient Education: patient educated on PT role and benefits, functional mobility training  Learning Assessment:  patient will require reinforcement due to cognitive deficits    Assessment  Activity Tolerance: Pt limited by cognition and emotional liability throughout the PT/OT initial evaluation. Impairments Requiring Therapeutic Intervention: none - eval with same day discharge  Prognosis: poor  Clinical Assessment: Pt is an 82 y/o female who presents to the hospital from The [address] [place] [address] for shortness of breath. Pt is dependent at baseline for performance of all functional mobility tasks. Pt is presenting at her baseline function and no further skilled PT is recommended at this time. Safety Interventions: patient left in bed, bed alarm in place, call light within reach, and nurse notified    Plan  Frequency: Eval with same day discharge. No follow up required. Current Treatment Recommendations: not applicable, evaluation completed with same day discharge.     Goals  Patient Goals: Pt unable to state     Therapy Session Time      Individual Group Co-treatment   Time In     1502   Time Out     1532   Minutes     30     Timed Code Treatment Minutes:  15 Minutes  Total Treatment Minutes: 30 Minutes       Electronically Signed By: Mary Kate Uriarte, PT  Radha Dowd, DPT [id number]

## 2022-12-23 NOTE — ED PROVIDER NOTES
EMERGENCY DEPARTMENT PROVIDER NOTE    Patient Identification  Pt Name: Naman Vazquez  MRN: [id number]  [place] 1935  Date of evaluation: 12/22/2022  Provider: Nancy Henry DO  PCP: No primary care provider on file. Chief Complaint  Shortness of Breath, Positive For Covid-19, and Other [facility] [address] COUGH   ALSO PAINFUL BEDSORE ON BACKSIDE)      HPI  (History provided by EMS, NH report)  This is a 81 y.o. female with pertinent past medical history of COPD, advanced dementia who was brought in by EMS transportation from her nursing home for shortness of breath. Patient with history of dementia, only moans and cries during examination, she is unable to contribute much meaningfully to history. Per EMS, patient with known COVID-19 positive test at nursing home, unclear for what duration she has been ill. She has had frequent nonproductive cough. Nursing home staff also concerned for bedsore on her back. On arrival to the emergency department, patient is hypoxic with oxygen saturation in the 80s on room air, does not use supplemental oxygen at baseline. ROS    Unable to obtain due to history of dementia    I have reviewed the following nursing documentation:  Allergies: Patient has no known allergies.     Past medical history:   Past Medical History:   Diagnosis Date    COPD (chronic obstructive pulmonary disease) ([facility] [facility]     Dementia ([facility] [facility]     Diverticulosis     Glaucoma     Hypertension     Hypothyroid     Legal blindness      Past surgical history:   Past Surgical History:   Procedure Laterality Date    EYE SURGERY      JOINT REPLACEMENT      TONSILLECTOMY         Home medications:   Previous Medications    ALBUTEROL SULFATE HFA (VENTOLIN HFA) 108 (90 BASE) MCG/ACT INHALER    Inhale 2 puffs into the lungs as needed for Wheezing    LEVOTHYROXINE (SYNTHROID) 112 MCG TABLET    Take 112 mcg by mouth Daily    MELATONIN 3 MG TABS TABLET    Take 6 mg by mouth nightly as needed    MICONAZOLE NITRATE 2 % OINT    Apply topically 2 times daily    NIFEDIPINE (PROCARDIA XL) 60 MG EXTENDED RELEASE TABLET    Take 60 mg by mouth daily    OXYCODONE-ACETAMINOPHEN (PERCOCET)  MG PER TABLET    Take 1 tablet by mouth in the morning and 1 tablet in the evening. POLYETHYLENE GLYCOL (GLYCOLAX) 17 G PACKET    Take 17 g by mouth daily as needed for Constipation    QUETIAPINE (SEROQUEL) 25 MG TABLET    Take 25 mg by mouth nightly    QUETIAPINE (SEROQUEL) 25 MG TABLET    Take 12.5 mg by mouth every 6 hours as needed for Agitation    SOLIFENACIN (VESICARE) 5 MG TABLET    Take 5 mg by mouth daily    TAMSULOSIN (FLOMAX) 0.4 MG CAPSULE    Take 0.4 mg by mouth nightly    VITAMIN E 400 UNIT CAPSULE    Take 400 Units by mouth daily       Social history:  reports that she has quit smoking. Her smoking use included cigarettes. She has a 10.00 pack-year smoking history. She does not have any smokeless tobacco history on file. She reports that she does not drink alcohol and does not use drugs. Family history:    Family History   Problem Relation Age of Onset    Coronary Art Dis Father          Exam  ED Triage Vitals [12/22/22 1751]   BP Temp Temp Source Heart Rate Resp SpO2 Height Weight   102/78 96.8 °F (36 °C) Axillary 100 20 (!) 88 % -- --     Nursing note and vitals reviewed. Constitutional: Thin, ill-appearing. She is in respiratory distress. HENT:      Head: Normocephalic and atraumatic. Ears: External ears normal.      Nose: Nose normal.     Mouth: Membrane mucosa dry and pink. Eyes: Anicteric sclera. No discharge. Neck: Supple. Trachea midline. Cardiovascular: Borderline tachycardia, regular rhythm; no murmurs, rubs, or gallops. Pulmonary/Chest: Respiratory distress with tachypnea. No retractions. Breath sounds diminished throughout with end expiratory wheezes. No stridor. No rales. Abdominal: Soft. No distension.   No distress elicited with deep palpation all quadrants. Musculoskeletal: Moves all extremities. No gross deformity. Neurological: Alert, moans and cries in bed, does not answer questions. Will track examiner with eyes across midline and briefly squeeze my fingers on command. Briskly withdraws all extremities to pressure. Skin: Warm and dry. Unstageable pressure ulcer to left hip with overlying eschar, minimal surrounding erythema, no discharge or drainage. No malodor. Procedures      EKG    EKG was reviewed by emergency department physician in the absence of a cardiologist    Narrow complex sinus rhythm, rate 97, left axis deviation, normal MN and QRS intervals, normal Qtc, no ST elevations or depressions, TWI V2 and V3, impression NSR with incomplete RBBB and nonspecific T wave morphology, no STEMI, limited by baseline artifact unable to resolve with repeated attempt.       Radiology  XR CHEST PORTABLE   Final Result      Lungs are clear             Labs  Results for orders placed or performed during the hospital encounter of 12/22/22   Rapid influenza A/B antigens    Specimen: Nasopharyngeal   Result Value Ref Range    Rapid Influenza A Ag Negative Negative    Rapid Influenza B Ag Negative Negative   CBC with Auto Differential   Result Value Ref Range    WBC 15.3 (H) 4.0 - 11.0 K/uL    RBC 4.00 4.00 - 5.20 M/uL    Hemoglobin 12.0 12.0 - 16.0 g/dL    Hematocrit 37.4 36.0 - 48.0 %    MCV 93.4 80.0 - 100.0 fL    MCH 30.0 26.0 - 34.0 pg    MCHC 32.1 31.0 - 36.0 g/dL    RDW 15.6 (H) 12.4 - 15.4 %    Platelets 015 917 - 049 K/uL    MPV 8.9 5.0 - 10.5 fL    Neutrophils % 87.6 %    Lymphocytes % 6.6 %    Monocytes % 5.4 %    Eosinophils % 0.1 %    Basophils % 0.3 %    Neutrophils Absolute 13.4 (H) 1.7 - 7.7 K/uL    Lymphocytes Absolute 1.0 1.0 - 5.1 K/uL    Monocytes Absolute 0.8 0.0 - 1.3 K/uL    Eosinophils Absolute 0.0 0.0 - 0.6 K/uL    Basophils Absolute 0.0 0.0 - 0.2 K/uL   CMP w/ Reflex to MG   Result Value Ref Range    Sodium 147 (H) 136 - 145 mmol/L Potassium reflex Magnesium 4.7 3.5 - 5.1 mmol/L    Chloride 108 99 - 110 mmol/L    CO2 29 21 - 32 mmol/L    Anion Gap 10 3 - 16    Glucose 89 70 - 99 mg/dL    BUN 42 (H) 7 - 20 mg/dL    Creatinine 1.0 0.6 - 1.2 mg/dL    Est, Glom Filt Rate 55 (A) >60    Calcium 9.3 8.3 - 10.6 mg/dL    Total Protein 6.9 6.4 - 8.2 g/dL    Albumin 3.1 (L) 3.4 - 5.0 g/dL    Albumin/Globulin Ratio 0.8 (L) 1.1 - 2.2    Total Bilirubin 0.4 0.0 - 1.0 mg/dL    Alkaline Phosphatase 62 40 - 129 U/L    ALT 6 (L) 10 - 40 U/L    AST 14 (L) 15 - 37 U/L   Lactate, Sepsis   Result Value Ref Range    Lactic Acid, Sepsis 1.6 0.4 - 1.9 mmol/L   Urinalysis with Reflex to Culture    Specimen: Urine   Result Value Ref Range    Color, UA Yellow Straw/Yellow    Clarity, UA SLCLOUDY Clear    Glucose, Ur Negative Negative mg/dL    Bilirubin Urine SMALL (A) Negative    Ketones, Urine Negative Negative mg/dL    Specific Gravity, UA 1.015 1.005 - 1.030    Blood, Urine TRACE-INTACT (A) Negative    pH, UA 5.0 5.0 - 8.0    Protein, UA Negative Negative mg/dL    Urobilinogen, Urine 0.2 <2.0 E.U./dL    Nitrite, Urine Negative Negative    Leukocyte Esterase, Urine MODERATE (A) Negative    Microscopic Examination YES     Urine Type NotGiven     Urine Reflex to Culture Yes    Troponin   Result Value Ref Range    Troponin 0.09 (H) <0.01 ng/mL   Blood gas, venous   Result Value Ref Range    pH, Allan 7.364 7.350 - 7.450    pCO2, Allan 52.1 (H) 40.0 - 50.0 mmHg    pO2, Allan 60.8 (H) 25.0 - 40.0 mmHg    HCO3, Venous 29.7 (H) 23.0 - 29.0 mmol/L    Base Excess, Allan 3.4 (H) -3.0 - 3.0 mmol/L    O2 Sat, Allan 92 Not Established %    Carboxyhemoglobin 3.1 (H) 0.0 - 1.5 %    MetHgb, Allan 0.0 <1.5 %    TC02 (Calc), Allan 70 Not Established mmol/L    O2 Content, Allan 15 Not Established VOL %    O2 Therapy Unknown     Hemoglobin, Allan, Reduced 7 %   Procalcitonin   Result Value Ref Range    Procalcitonin 0.11 0.00 - 0.15 ng/mL   Brain Natriuretic Peptide   Result Value Ref Range    Pro-BNP 1,458 (H) 0 - 449 pg/mL   Microscopic Urinalysis   Result Value Ref Range    WBC, UA 10-20 (A) 0 - 5 /HPF    RBC, UA 0-2 0 - 4 /HPF    Epithelial Cells, UA 2-5 0 - 5 /HPF    Bacteria, UA 2+ (A) None Seen /HPF    Urinalysis Comments see below    EKG 12 Lead   Result Value Ref Range    Ventricular Rate 97 BPM    Atrial Rate 97 BPM    P-R Interval 182 ms    QRS Duration 114 ms    Q-T Interval 388 ms    QTc Calculation (Bazett) 492 ms    P Axis 54 degrees    R Axis -76 degrees    T Axis 38 degrees    Diagnosis       Sinus rhythm with Fusion complexes and Premature atrial complexes with Aberrant conductionLeft axis deviationRight bundle branch blockInferior infarct , age undeterminedAnterolateral infarct , age undeterminedAbnormal ECG       Screenings   Klaudia Coma Scale  Eye Opening: Spontaneous  Best Verbal Response: Confused  Best Motor Response: Localizes pain  Klaudia Coma Scale Score: 13       Is this patient to be included in the SEP-1 Core Measure due to severe sepsis or septic shock? No   Exclusion criteria - the patient is NOT to be included for SEP-1 Core Measure due to:  May have criteria for sepsis, but does not meet criteria for severe sepsis or septic shock      MDM and ED Course    Patient afebrile however ill-appearing and in respiratory distress on arrival to the emergency department. Her initial hypoxia was corrected with supplemental oxygen by nasal cannula, ultimately requiring 4 to 5 L to maintain oxygen saturation in the low 90s. Wheezing on exam, suspect likely underlying exacerbation of COPD. CXR without evidence of pneumonia, pneumothorax or other acute finding. Laboratory with elevated BNP, clinically patient appears volume depleted, doubt acute CHF exacerbation. Pulmonary embolism is considered however this is not the most likely diagnosis. EKG, troponin minimally elevated however patient with chronic nonzero troponin, suspect likely secondary to hypoxia and acute illness, doubt ACS. No malignant dysrhythmia. Procalcitonin normal, doubt superimposed bacterial pneumonia. Patient with pressure ulcer of left hip region, does not clearly appear infected. Nothing to suggest NSTI.  urinalysis is with infection and will treat with ceftriaxone. Patient also received breathing treatments and Decadron for COPD and hypoxia in the setting of COVID-19. On my reevaluation, her distress was resolved, she was still unable to answer questions however was no longer moaning and appeared much more comfortable. Case discussed with internal medicine team and will plan for admission for further evaluation and care. Patient alert and hemodynamically stable at this time. I Dr. Sherrie Cannon am the primary clinician of record. Critical Care. Upon my evaluation, this patient had a high probability of imminent or life-threatening deterioration due to acute hypoxic respiratory failure which required my direct attention, intervention, and personal management. The total critical care time personally spent while evaluating and treating this patient was 32 minutes exclusive of any time spent doing separately billable procedures. This includes time at the bedside, data interpretation, medication management, monitoring for potential decompensation and physician consultation. Specifics of interventions taken and potentially life-threatening diagnostic considerations are listed above in the medical decision making. Final Impression  1. Acute respiratory failure with hypoxia and hypercapnia (HCC)    2. COVID-19    3. Urinary tract infection in female    4. Dementia with mood disturbance, unspecified dementia severity, unspecified dementia type    5. Pressure injury of skin of left hip, unspecified injury stage    6. COPD with acute exacerbation (HCC)        Blood pressure (!) 119/58, pulse 92, temperature 96.8 °F (36 °C), temperature source Axillary, resp. rate 19, SpO2 98 %.      Disposition:  DISPOSITION Decision To Admit 12/22/2022 08:31:05 PM      Patient Referrals:  No follow-up provider specified. Discharge Medications:  New Prescriptions    No medications on file       Discontinued Medications:  Discontinued Medications    No medications on file       This chart was generated using the [address] [place] [address] 19[address]  dictation system. I created this record but it may contain dictation errors given the limitations of this technology.     Jonel June DO (electronically signed)  Attending Emergency Physician       Jonel June DO  12/23/22 0126

## 2022-12-24 LAB
ANION GAP SERPL CALCULATED.3IONS-SCNC: 9 MMOL/L (ref 3–16)
BASOPHILS ABSOLUTE: 0 K/UL (ref 0–0.2)
BASOPHILS RELATIVE PERCENT: 0 %
BUN BLDV-MCNC: 24 MG/DL (ref 7–20)
CALCIUM SERPL-MCNC: 8.9 MG/DL (ref 8.3–10.6)
CHLORIDE BLD-SCNC: 106 MMOL/L (ref 99–110)
CO2: 27 MMOL/L (ref 21–32)
CREAT SERPL-MCNC: 0.7 MG/DL (ref 0.6–1.2)
EOSINOPHILS ABSOLUTE: 0 K/UL (ref 0–0.6)
EOSINOPHILS RELATIVE PERCENT: 0 %
GFR SERPL CREATININE-BSD FRML MDRD: >60 ML/MIN/{1.73_M2}
GLUCOSE BLD-MCNC: 101 MG/DL (ref 70–99)
GLUCOSE BLD-MCNC: 104 MG/DL (ref 70–99)
GLUCOSE BLD-MCNC: 87 MG/DL (ref 70–99)
GLUCOSE BLD-MCNC: 91 MG/DL (ref 70–99)
HCT VFR BLD CALC: 34.6 % (ref 36–48)
HEMOGLOBIN: 10.9 G/DL (ref 12–16)
LYMPHOCYTES ABSOLUTE: 0.7 K/UL (ref 1–5.1)
LYMPHOCYTES RELATIVE PERCENT: 4 %
MCH RBC QN AUTO: 28.7 PG (ref 26–34)
MCHC RBC AUTO-ENTMCNC: 31.4 G/DL (ref 31–36)
MCV RBC AUTO: 91.2 FL (ref 80–100)
MICROCYTES: ABNORMAL
MONOCYTES ABSOLUTE: 0.7 K/UL (ref 0–1.3)
MONOCYTES RELATIVE PERCENT: 4 %
MRSA SCREEN RT-PCR: ABNORMAL
NEUTROPHILS ABSOLUTE: 16.5 K/UL (ref 1.7–7.7)
NEUTROPHILS RELATIVE PERCENT: 92 %
ORGANISM: ABNORMAL
PDW BLD-RTO: 15.3 % (ref 12.4–15.4)
PERFORMED ON: ABNORMAL
PERFORMED ON: ABNORMAL
PERFORMED ON: NORMAL
PLATELET # BLD: 217 K/UL (ref 135–450)
PLATELET SLIDE REVIEW: ADEQUATE
PMV BLD AUTO: 9.1 FL (ref 5–10.5)
POTASSIUM SERPL-SCNC: 4 MMOL/L (ref 3.5–5.1)
RBC # BLD: 3.79 M/UL (ref 4–5.2)
SCHISTOCYTES: ABNORMAL
SLIDE REVIEW: ABNORMAL
SODIUM BLD-SCNC: 142 MMOL/L (ref 136–145)
WBC # BLD: 17.9 K/UL (ref 4–11)

## 2022-12-24 PROCEDURE — 6360000002 HC RX W HCPCS: Performed by: HOSPITALIST

## 2022-12-24 PROCEDURE — 6360000002 HC RX W HCPCS: Performed by: STUDENT IN AN ORGANIZED HEALTH CARE EDUCATION/TRAINING PROGRAM

## 2022-12-24 PROCEDURE — 2580000003 HC RX 258: Performed by: HOSPITALIST

## 2022-12-24 PROCEDURE — 85025 COMPLETE CBC W/AUTO DIFF WBC: CPT

## 2022-12-24 PROCEDURE — 80048 BASIC METABOLIC PNL TOTAL CA: CPT

## 2022-12-24 PROCEDURE — 2060000000 HC ICU INTERMEDIATE R&B

## 2022-12-24 PROCEDURE — 2580000003 HC RX 258: Performed by: STUDENT IN AN ORGANIZED HEALTH CARE EDUCATION/TRAINING PROGRAM

## 2022-12-24 PROCEDURE — 6370000000 HC RX 637 (ALT 250 FOR IP): Performed by: STUDENT IN AN ORGANIZED HEALTH CARE EDUCATION/TRAINING PROGRAM

## 2022-12-24 RX ORDER — SODIUM CHLORIDE 9 MG/ML
INJECTION, SOLUTION INTRAVENOUS
Status: DISPENSED
Start: 2022-12-24 | End: 2022-12-24

## 2022-12-24 RX ORDER — DEXTROSE MONOHYDRATE 50 MG/ML
INJECTION, SOLUTION INTRAVENOUS CONTINUOUS
Status: ACTIVE | OUTPATIENT
Start: 2022-12-24 | End: 2022-12-25

## 2022-12-24 RX ADMIN — DEXTROSE MONOHYDRATE: 50 INJECTION, SOLUTION INTRAVENOUS at 18:48

## 2022-12-24 RX ADMIN — LEVOTHYROXINE SODIUM 112 MCG: 0.11 TABLET ORAL at 05:55

## 2022-12-24 RX ADMIN — DEXTROSE MONOHYDRATE: 50 INJECTION, SOLUTION INTRAVENOUS at 12:41

## 2022-12-24 RX ADMIN — Medication 10 ML: at 22:13

## 2022-12-24 RX ADMIN — ENOXAPARIN SODIUM 30 MG: 100 INJECTION SUBCUTANEOUS at 09:50

## 2022-12-24 RX ADMIN — CEFTRIAXONE SODIUM 1000 MG: 1 INJECTION, POWDER, FOR SOLUTION INTRAMUSCULAR; INTRAVENOUS at 22:13

## 2022-12-24 ASSESSMENT — PAIN SCALES - PAIN ASSESSMENT IN ADVANCED DEMENTIA (PAINAD)
FACIALEXPRESSION: 0
BODYLANGUAGE: 0
TOTALSCORE: 4
NEGVOCALIZATION: 1
CONSOLABILITY: 0
BREATHING: 0
NEGVOCALIZATION: 0
TOTALSCORE: 0
BREATHING: 0
BREATHING: 0
CONSOLABILITY: 0
BODYLANGUAGE: 1
TOTALSCORE: 0
FACIALEXPRESSION: 1
BODYLANGUAGE: 0
NEGVOCALIZATION: 0
FACIALEXPRESSION: 0
CONSOLABILITY: 1

## 2022-12-24 NOTE — PROGRESS NOTES
[facility]    12/24/2022 8:35 AM        Name: Homero Egan Admitted: 12/22/2022  Primary Care Provider: No primary care provider on file. (Tel: None)                        Subjective:  . No acute events overnight. Resting well. Pain control. Diet ok. Labs reviewed  Denies any chest pain sob.      Reviewed interval ancillary notes    Current Medications  enoxaparin Sodium (LOVENOX) injection 30 mg, Daily  dextrose 5 % solution, Continuous  cefTRIAXone (ROCEPHIN) 1,000 mg in dextrose 5 % 50 mL IVPB mini-bag, Q24H  albuterol sulfate HFA (PROVENTIL;VENTOLIN;PROAIR) 108 (90 Base) MCG/ACT inhaler 2 puff, PRN  levothyroxine (SYNTHROID) tablet 112 mcg, Daily  melatonin tablet 6 mg, Nightly PRN  QUEtiapine (SEROQUEL) tablet 25 mg, Nightly  tamsulosin (FLOMAX) capsule 0.4 mg, Nightly  sodium chloride flush 0.9 % injection 5-40 mL, 2 times per day  sodium chloride flush 0.9 % injection 5-40 mL, PRN  0.9 % sodium chloride infusion, PRN  ondansetron (ZOFRAN-ODT) disintegrating tablet 4 mg, Q8H PRN   Or  ondansetron (ZOFRAN) injection 4 mg, Q6H PRN  polyethylene glycol (GLYCOLAX) packet 17 g, Daily PRN  acetaminophen (TYLENOL) tablet 650 mg, Q6H PRN   Or  acetaminophen (TYLENOL) suppository 650 mg, Q6H PRN  magnesium sulfate 2000 mg in 50 mL IVPB premix, PRN  potassium chloride (KLOR-CON M) extended release tablet 40 mEq, PRN   Or  potassium bicarb-citric acid (EFFER-K) effervescent tablet 40 mEq, PRN   Or  potassium chloride 10 mEq/100 mL IVPB (Peripheral Line), PRN        Objective:  BP (!) 144/69   Pulse 84   Temp 98.7 °F (37.1 °C) (Axillary)   Resp 18   Ht 5' 4\" (1.626 m)   Wt 103 lb 8 oz (46.9 kg)   SpO2 90%   BMI 17.77 kg/m²     Intake/Output Summary (Last 24 hours) at 12/24/2022 0835  Last data filed at 12/24/2022 0521  Gross per 24 hour   Intake 60 ml Output 625 ml   Net -565 ml      Wt Readings from Last 3 Encounters:   12/24/22 103 lb 8 oz (46.9 kg)   09/30/22 128 lb 4.9 oz (58.2 kg)       General appearance:  Appears comfortable  Eyes: Sclera clear. Pupils equal.  ENT: Moist oral mucosa. Trachea midline, no adenopathy, neck supple. Cardiovascular: Regular rhythm, normal S1, S2. No murmur. No edema in lower extremities  Respiratory: Not using accessory muscles. Good inspiratory effort. Clear to auscultation bilaterally, no wheeze or crackles. GI: Abdomen soft, no tenderness, not distended, normal bowel sounds  Musculoskeletal: No deformity, ROM WNL. Neurology: CN 2-12 grossly intact. No speech or motor deficits  Psych: Normal affect. Alert and oriented in time, place and person  Skin: Warm, dry, normal turgor    Labs and Tests:  CBC:   Recent Labs     12/22/22  1844 12/23/22  0617   WBC 15.3* 15.3*   HGB 12.0 10.8*   HCT 37.4 33.3*   MCV 93.4 90.5    247     BMP:    Recent Labs     12/22/22  1844 12/23/22  0617   * 148*   K 4.7 4.5    112*   CO2 29 25   BUN 42* 44*   CREATININE 1.0 1.0   GLUCOSE 89 168*     Hepatic:   Recent Labs     12/22/22  1844   AST 14*   ALT 6*   BILITOT 0.4   ALKPHOS 62         Problem List  Principal Problem:    Sepsis ([facility] [address]  Active Problems:    UTI (urinary tract infection)    HTN (hypertension)    Hypothyroidism    (HFpEF) heart failure with preserved ejection fraction (HCC)    Acute respiratory failure with hypoxia (HCC)    Dementia (HCC)    COPD (chronic obstructive pulmonary disease) ([facility] [address]    COVID-19  Resolved Problems:    * No resolved hospital problems. *       Assessment & Plan:     Sepsis: Fever and leukocytosis noted, blood cultures so far, continue empiric ceftriaxone.   MRSA screening panel come back positive, continue contact precautions  COVID-19: Continue contact and droplet precautions, supplemental oxygen as needed, continue inhaler treatment, not on dexamethasone, currently on 2L per NC  Hypernatremia: Continue with D5W, unable to draw BMP this morning we will try with 1 of emergency room nurses if hopefully draw blood at  Compensated diastolic heart failure: proBNP 1450, chest x-ray clear showed no congestion. No peripheral edema noted  Pyuria: Continue ceftriaxone, urine culture pending  Deconditioning: PT OT consulted, speech therapy recommending puréed diet        Diet: ADULT DIET; Dysphagia - Pureed  ADULT ORAL NUTRITION SUPPLEMENT; Breakfast, Dinner; Standard High Calorie/High Protein Oral Supplement  ADULT ORAL NUTRITION SUPPLEMENT; Lunch;  Fortified Pudding Oral Supplement  ADULT ORAL NUTRITION SUPPLEMENT; Dinner; Frozen Oral Supplement  Code:Full Code  DVT PPX lovenox       Dimitry Mccauley MD   12/24/2022 8:35 AM

## 2022-12-24 NOTE — RT PROTOCOL NOTE
RT Inhaler-Nebulizer Bronchodilator Protocol Note    There is a bronchodilator order in the chart from a provider indicating to follow the RT Bronchodilator Protocol and there is an Initiate RT Inhaler-Nebulizer Bronchodilator Protocol order as well (see protocol at bottom of note). CXR Findings:  XR CHEST PORTABLE    Result Date: 12/22/2022  Lungs are clear       The findings from the last RT Protocol Assessment were as follows:   History Pulmonary Disease: None or smoker <15 pack years  Respiratory Pattern: Regular pattern and RR 12-20 bpm  Breath Sounds: Slightly diminished and/or crackles  Cough: Strong, spontaneous, non-productive  Indication for Bronchodilator Therapy:    Bronchodilator Assessment Score: 2    Aerosolized bronchodilator medication orders have been revised according to the RT Inhaler-Nebulizer Bronchodilator Protocol below. Respiratory Therapist to perform RT Therapy Protocol Assessment initially then follow the protocol. Repeat RT Therapy Protocol Assessment PRN for score 0-3 or on second treatment, BID, and PRN for scores above 3. No Indications - adjust the frequency to every 6 hours PRN wheezing or bronchospasm, if no treatments needed after 48 hours then discontinue using Per Protocol order mode. If indication present, adjust the RT bronchodilator orders based on the Bronchodilator Assessment Score as indicated below. Use Inhaler orders unless patient has one or more of the following: on home nebulizer, not able to hold breath for 10 seconds, is not alert and oriented, cannot activate and use MDI correctly, or respiratory rate 25 breaths per minute or more, then use the equivalent nebulizer order(s) with same Frequency and PRN reasons based on the score. If a patient is on this medication at home then do not decrease Frequency below that used at home.     0-3 - enter or revise RT bronchodilator order(s) to equivalent RT Bronchodilator order with Frequency of every 4 hours PRN for wheezing or increased work of breathing using Per Protocol order mode. 4-6 - enter or revise RT Bronchodilator order(s) to two equivalent RT bronchodilator orders with one order with BID Frequency and one order with Frequency of every 4 hours PRN wheezing or increased work of breathing using Per Protocol order mode. 7-10 - enter or revise RT Bronchodilator order(s) to two equivalent RT bronchodilator orders with one order with TID Frequency and one order with Frequency of every 4 hours PRN wheezing or increased work of breathing using Per Protocol order mode. 11-13 - enter or revise RT Bronchodilator order(s) to one equivalent RT bronchodilator order with QID Frequency and an Albuterol order with Frequency of every 4 hours PRN wheezing or increased work of breathing using Per Protocol order mode. Greater than 13 - enter or revise RT Bronchodilator order(s) to one equivalent RT bronchodilator order with every 4 hours Frequency and an Albuterol order with Frequency of every 2 hours PRN wheezing or increased work of breathing using Per Protocol order mode. RT to enter RT Home Evaluation for COPD & MDI Assessment order using Per Protocol order mode.     Electronically signed by Ben Randall RCP on 12/24/2022 at 2:14 AM

## 2022-12-24 NOTE — PLAN OF CARE
Problem: Discharge Planning  Goal: Discharge to home or other facility with appropriate resources  Outcome: Progressing  Flowsheets (Taken 12/23/2022 2028)  Discharge to home or other facility with appropriate resources: Identify barriers to discharge with patient and caregiver     Problem: Pain  Goal: Verbalizes/displays adequate comfort level or baseline comfort level  Outcome: Progressing  Flowsheets (Taken 12/23/2022 1932)  Verbalizes/displays adequate comfort level or baseline comfort level:   Encourage patient to monitor pain and request assistance   Assess pain using appropriate pain scale   Administer analgesics based on type and severity of pain and evaluate response   Implement non-pharmacological measures as appropriate and evaluate response     Problem: Neurosensory - Adult  Goal: Achieves stable or improved neurological status  Outcome: Progressing  Flowsheets (Taken 12/23/2022 2028)  Achieves stable or improved neurological status:   Assess for and report changes in neurological status   Initiate measures to prevent increased intracranial pressure   Maintain blood pressure and fluid volume within ordered parameters to optimize cerebral perfusion and minimize risk of hemorrhage   Monitor temperature, glucose, and sodium. Initiate appropriate interventions as ordered  Goal: Absence of seizures  Outcome: Progressing  Flowsheets (Taken 12/23/2022 2028)  Absence of seizures:   Monitor for seizure activity.   If seizure occurs, document type and location of movements and any associated apnea   If seizure occurs, turn head to side and suction secretions as needed   Diagnostic studies as ordered  Goal: Remains free of injury related to seizures activity  Outcome: Progressing  Flowsheets (Taken 12/23/2022 2028)  Remains free of injury related to seizure activity:   Maintain airway, patient safety  and administer oxygen as ordered   Monitor patient for seizure activity, document and report duration and description of seizure to Licensed Independent Practitioner   If seizure occurs, turn patient to side and suction secretions as needed  Goal: Achieves maximal functionality and self care  Outcome: Progressing  Flowsheets (Taken 12/23/2022 2028)  Achieves maximal functionality and self care:   Monitor swallowing and airway patency with patient fatigue and changes in neurological status   Encourage and assist patient to increase activity and self care with guidance from physical therapy/occupational therapy     Problem: Respiratory - Adult  Goal: Achieves optimal ventilation and oxygenation  Outcome: Progressing  Flowsheets (Taken 12/23/2022 2028)  Achieves optimal ventilation and oxygenation:   Assess for changes in respiratory status   Assess for changes in mentation and behavior   Position to facilitate oxygenation and minimize respiratory effort     Problem: Cardiovascular - Adult  Goal: Maintains optimal cardiac output and hemodynamic stability  Outcome: Progressing  Flowsheets (Taken 12/23/2022 2028)  Maintains optimal cardiac output and hemodynamic stability:   Monitor blood pressure and heart rate   Monitor urine output and notify Licensed Independent Practitioner for values outside of normal range   Assess for signs of decreased cardiac output  Goal: Absence of cardiac dysrhythmias or at baseline  Outcome: Progressing  Flowsheets (Taken 12/23/2022 2028)  Absence of cardiac dysrhythmias or at baseline:   Monitor cardiac rate and rhythm   Assess for signs of decreased cardiac output     Problem: Skin/Tissue Integrity - Adult  Goal: Skin integrity remains intact  Outcome: Progressing  Flowsheets (Taken 12/23/2022 2028)  Skin Integrity Remains Intact:   Monitor for areas of redness and/or skin breakdown   Assess vascular access sites hourly  Goal: Incisions, wounds, or drain sites healing without S/S of infection  Outcome: Progressing  Flowsheets (Taken 12/23/2022 2028)  Incisions, Wounds, or Drain Sites Healing Without Sign and Symptoms of Infection: TWICE DAILY: Assess and document skin integrity  Goal: Oral mucous membranes remain intact  Outcome: Progressing  Flowsheets (Taken 12/23/2022 2028)  Oral Mucous Membranes Remain Intact:   Assess oral mucosa and hygiene practices   Implement preventative oral hygiene regimen     Problem: Musculoskeletal - Adult  Goal: Return mobility to safest level of function  Outcome: Progressing  Flowsheets (Taken 12/23/2022 2028)  Return Mobility to Safest Level of Function: Assess patient stability and activity tolerance for standing, transferring and ambulating with or without assistive devices  Goal: Maintain proper alignment of affected body part  Outcome: Progressing  Flowsheets (Taken 12/23/2022 2028)  Maintain proper alignment of affected body part:   Support and protect limb and body alignment per provider's orders   Instruct and reinforce with patient and family use of appropriate assistive device and precautions (e.g. spinal or hip dislocation precautions)  Goal: Return ADL status to a safe level of function  Outcome: Progressing  Flowsheets (Taken 12/23/2022 2028)  Return ADL Status to a Safe Level of Function:   Administer medication as ordered   Assess activities of daily living deficits and provide assistive devices as needed   Assist and instruct patient to increase activity and self care as tolerated   Obtain physical therapy/occupational therapy consults as needed     Problem: Genitourinary - Adult  Goal: Absence of urinary retention  Outcome: Progressing  Flowsheets (Taken 12/23/2022 2028)  Absence of urinary retention:   Assess patients ability to void and empty bladder   Monitor intake/output and perform bladder scan as needed  Goal: Urinary catheter remains patent  Outcome: Progressing  Flowsheets (Taken 12/23/2022 2028)  Urinary catheter remains patent: Assess patency of urinary catheter     Problem: Infection - Adult  Goal: Absence of infection at discharge  Outcome: Progressing  Flowsheets (Taken 12/23/2022 2028)  Absence of infection at discharge:   Monitor lab/diagnostic results   Assess and monitor for signs and symptoms of infection   Monitor all insertion sites i.e., indwelling lines, tubes and drains   Monitor endotracheal (as able) and nasal secretions for changes in amount and color   Administer medications as ordered  Goal: Absence of infection during hospitalization  Outcome: Progressing  Flowsheets (Taken 12/23/2022 2028)  Absence of infection during hospitalization:   Assess and monitor for signs and symptoms of infection   Monitor lab/diagnostic results   Monitor all insertion sites i.e., indwelling lines, tubes and drains   Administer medications as ordered  Goal: Absence of fever/infection during anticipated neutropenic period  Outcome: Progressing  Flowsheets (Taken 12/23/2022 2028)  Absence of fever/infection during anticipated neutropenic period: Monitor white blood cell count     Problem: Skin/Tissue Integrity  Goal: Absence of new skin breakdown  Description: 1. Monitor for areas of redness and/or skin breakdown  2. Assess vascular access sites hourly  3. Every 4-6 hours minimum:  Change oxygen saturation probe site  4. Every 4-6 hours:  If on nasal continuous positive airway pressure, respiratory therapy assess nares and determine need for appliance change or resting period.   Outcome: Progressing     Problem: Safety - Adult  Goal: Free from fall injury  Outcome: Progressing     Problem: ABCDS Injury Assessment  Goal: Absence of physical injury  Outcome: Progressing     Problem: Nutrition Deficit:  Goal: Optimize nutritional status  Outcome: Progressing

## 2022-12-24 NOTE — PLAN OF CARE
Problem: Discharge Planning  Goal: Discharge to home or other facility with appropriate resources  Outcome: Progressing     Problem: Pain  Goal: Verbalizes/displays adequate comfort level or baseline comfort level  Outcome: Progressing     Problem: Neurosensory - Adult  Goal: Achieves stable or improved neurological status  Outcome: Progressing     Problem: Cardiovascular - Adult  Goal: Maintains optimal cardiac output and hemodynamic stability  Outcome: Progressing     Problem: Skin/Tissue Integrity - Adult  Goal: Oral mucous membranes remain intact  Outcome: Progressing     Problem: Musculoskeletal - Adult  Goal: Return ADL status to a safe level of function  Outcome: Progressing     Problem: Genitourinary - Adult  Goal: Absence of urinary retention  Outcome: Progressing     Problem: Skin/Tissue Integrity  Goal: Absence of new skin breakdown  Description: 1. Monitor for areas of redness and/or skin breakdown  2. Assess vascular access sites hourly  3. Every 4-6 hours minimum:  Change oxygen saturation probe site  4. Every 4-6 hours:  If on nasal continuous positive airway pressure, respiratory therapy assess nares and determine need for appliance change or resting period.   Outcome: Progressing     Problem: Safety - Adult  Goal: Free from fall injury  Outcome: Progressing

## 2022-12-24 NOTE — PROGRESS NOTES
CBC, BMP drawn per PICC RN. Sent to lab. Patient repositioned with pillow support. Dressing changed to R hip, mepilex applied to coccyx. Patient very tearful, fights against care, moans and yells with touch and movement. Oriented to self. Calls out for her mother. VSS.  02 sat 91% on RA. Will continue to monitor.

## 2022-12-25 LAB
ALBUMIN SERPL-MCNC: 2.9 G/DL (ref 3.4–5)
ANION GAP SERPL CALCULATED.3IONS-SCNC: 11 MMOL/L (ref 3–16)
BASOPHILS ABSOLUTE: 0 K/UL (ref 0–0.2)
BASOPHILS RELATIVE PERCENT: 0.3 %
BUN BLDV-MCNC: 22 MG/DL (ref 7–20)
CALCIUM SERPL-MCNC: 9.1 MG/DL (ref 8.3–10.6)
CHLORIDE BLD-SCNC: 104 MMOL/L (ref 99–110)
CO2: 28 MMOL/L (ref 21–32)
CREAT SERPL-MCNC: 0.7 MG/DL (ref 0.6–1.2)
EOSINOPHILS ABSOLUTE: 0 K/UL (ref 0–0.6)
EOSINOPHILS RELATIVE PERCENT: 0.3 %
GFR SERPL CREATININE-BSD FRML MDRD: >60 ML/MIN/{1.73_M2}
GLUCOSE BLD-MCNC: 73 MG/DL (ref 70–99)
GLUCOSE BLD-MCNC: 73 MG/DL (ref 70–99)
GLUCOSE BLD-MCNC: 78 MG/DL (ref 70–99)
GLUCOSE BLD-MCNC: 86 MG/DL (ref 70–99)
GLUCOSE BLD-MCNC: 90 MG/DL (ref 70–99)
GLUCOSE BLD-MCNC: 92 MG/DL (ref 70–99)
GLUCOSE BLD-MCNC: 97 MG/DL (ref 70–99)
HCT VFR BLD CALC: 36.3 % (ref 36–48)
HEMOGLOBIN: 11.7 G/DL (ref 12–16)
LYMPHOCYTES ABSOLUTE: 0.9 K/UL (ref 1–5.1)
LYMPHOCYTES RELATIVE PERCENT: 6.1 %
MAGNESIUM: 1.8 MG/DL (ref 1.8–2.4)
MCH RBC QN AUTO: 28.6 PG (ref 26–34)
MCHC RBC AUTO-ENTMCNC: 32.1 G/DL (ref 31–36)
MCV RBC AUTO: 89.2 FL (ref 80–100)
MONOCYTES ABSOLUTE: 0.8 K/UL (ref 0–1.3)
MONOCYTES RELATIVE PERCENT: 5.5 %
NEUTROPHILS ABSOLUTE: 12.3 K/UL (ref 1.7–7.7)
NEUTROPHILS RELATIVE PERCENT: 87.8 %
PDW BLD-RTO: 14.8 % (ref 12.4–15.4)
PERFORMED ON: NORMAL
PHOSPHORUS: 1.7 MG/DL (ref 2.5–4.9)
PLATELET # BLD: 233 K/UL (ref 135–450)
PMV BLD AUTO: 8.5 FL (ref 5–10.5)
POTASSIUM SERPL-SCNC: 4.1 MMOL/L (ref 3.5–5.1)
RBC # BLD: 4.07 M/UL (ref 4–5.2)
SODIUM BLD-SCNC: 143 MMOL/L (ref 136–145)
WBC # BLD: 14 K/UL (ref 4–11)

## 2022-12-25 PROCEDURE — 85025 COMPLETE CBC W/AUTO DIFF WBC: CPT

## 2022-12-25 PROCEDURE — 36415 COLL VENOUS BLD VENIPUNCTURE: CPT

## 2022-12-25 PROCEDURE — 2060000000 HC ICU INTERMEDIATE R&B

## 2022-12-25 PROCEDURE — 2500000003 HC RX 250 WO HCPCS: Performed by: HOSPITALIST

## 2022-12-25 PROCEDURE — 2580000003 HC RX 258: Performed by: PHYSICIAN ASSISTANT

## 2022-12-25 PROCEDURE — 80069 RENAL FUNCTION PANEL: CPT

## 2022-12-25 PROCEDURE — 2580000003 HC RX 258: Performed by: HOSPITALIST

## 2022-12-25 PROCEDURE — 83735 ASSAY OF MAGNESIUM: CPT

## 2022-12-25 PROCEDURE — 6360000002 HC RX W HCPCS: Performed by: HOSPITALIST

## 2022-12-25 PROCEDURE — 6370000000 HC RX 637 (ALT 250 FOR IP): Performed by: STUDENT IN AN ORGANIZED HEALTH CARE EDUCATION/TRAINING PROGRAM

## 2022-12-25 PROCEDURE — 2580000003 HC RX 258: Performed by: STUDENT IN AN ORGANIZED HEALTH CARE EDUCATION/TRAINING PROGRAM

## 2022-12-25 RX ORDER — MORPHINE SULFATE 2 MG/ML
1 INJECTION, SOLUTION INTRAMUSCULAR; INTRAVENOUS EVERY 4 HOURS PRN
Status: DISCONTINUED | OUTPATIENT
Start: 2022-12-25 | End: 2022-12-26

## 2022-12-25 RX ORDER — DEXTROSE AND SODIUM CHLORIDE 5; .45 G/100ML; G/100ML
INJECTION, SOLUTION INTRAVENOUS CONTINUOUS
Status: DISCONTINUED | OUTPATIENT
Start: 2022-12-25 | End: 2022-12-27

## 2022-12-25 RX ADMIN — Medication 10 ML: at 21:30

## 2022-12-25 RX ADMIN — POTASSIUM PHOSPHATE, MONOBASIC AND POTASSIUM PHOSPHATE, DIBASIC 20 MMOL: 224; 236 INJECTION, SOLUTION, CONCENTRATE INTRAVENOUS at 11:30

## 2022-12-25 RX ADMIN — MORPHINE SULFATE 1 MG: 2 INJECTION, SOLUTION INTRAMUSCULAR; INTRAVENOUS at 14:55

## 2022-12-25 RX ADMIN — DEXTROSE AND SODIUM CHLORIDE: 5; 450 INJECTION, SOLUTION INTRAVENOUS at 21:36

## 2022-12-25 ASSESSMENT — PAIN SCALES - PAIN ASSESSMENT IN ADVANCED DEMENTIA (PAINAD)
NEGVOCALIZATION: 0
CONSOLABILITY: 1
FACIALEXPRESSION: 0
BREATHING: 0
NEGVOCALIZATION: 1
FACIALEXPRESSION: 0
TOTALSCORE: 2
TOTALSCORE: 2
BODYLANGUAGE: 0
BREATHING: 0
BODYLANGUAGE: 1
CONSOLABILITY: 1

## 2022-12-25 ASSESSMENT — PAIN SCALES - WONG BAKER
WONGBAKER_NUMERICALRESPONSE: 0
WONGBAKER_NUMERICALRESPONSE: 4
WONGBAKER_NUMERICALRESPONSE: 2
WONGBAKER_NUMERICALRESPONSE: 0
WONGBAKER_NUMERICALRESPONSE: 2

## 2022-12-25 NOTE — PROGRESS NOTES
[facility]    12/25/2022 8:36 AM        Name: Grace Hallman .              Admitted: 12/22/2022  Primary Care Provider: No primary care provider on file. (Tel: None)                        Subjective:  .    No acute events overnight. Resting well. Pain control. Diet ok.   Labs reviewed  Denies any chest pain sob.     Reviewed interval ancillary notes    Current Medications  enoxaparin Sodium (LOVENOX) injection 30 mg, Daily  albuterol sulfate HFA (PROVENTIL;VENTOLIN;PROAIR) 108 (90 Base) MCG/ACT inhaler 2 puff, PRN  levothyroxine (SYNTHROID) tablet 112 mcg, Daily  melatonin tablet 6 mg, Nightly PRN  QUEtiapine (SEROQUEL) tablet 25 mg, Nightly  tamsulosin (FLOMAX) capsule 0.4 mg, Nightly  sodium chloride flush 0.9 % injection 5-40 mL, 2 times per day  sodium chloride flush 0.9 % injection 5-40 mL, PRN  0.9 % sodium chloride infusion, PRN  ondansetron (ZOFRAN-ODT) disintegrating tablet 4 mg, Q8H PRN   Or  ondansetron (ZOFRAN) injection 4 mg, Q6H PRN  polyethylene glycol (GLYCOLAX) packet 17 g, Daily PRN  acetaminophen (TYLENOL) tablet 650 mg, Q6H PRN   Or  acetaminophen (TYLENOL) suppository 650 mg, Q6H PRN  magnesium sulfate 2000 mg in 50 mL IVPB premix, PRN  potassium chloride (KLOR-CON M) extended release tablet 40 mEq, PRN   Or  potassium bicarb-citric acid (EFFER-K) effervescent tablet 40 mEq, PRN   Or  potassium chloride 10 mEq/100 mL IVPB (Peripheral Line), PRN        Objective:  /60   Pulse 76   Temp 97.6 °F (36.4 °C) (Axillary)   Resp 17   Ht 5' 4\" (1.626 m)   Wt 103 lb 8 oz (46.9 kg)   SpO2 90%   BMI 17.77 kg/m²     Intake/Output Summary (Last 24 hours) at 12/25/2022 0836  Last data filed at 12/25/2022 0537  Gross per 24 hour   Intake 1789.05 ml   Output 575 ml   Net 1214.05 ml      Wt Readings from Last 3 Encounters:   12/25/22 103 lb 8 oz (46.9 kg)  09/30/22 128 lb 4.9 oz (58.2 kg)       General appearance:  Appears comfortable  Eyes: Sclera clear. Pupils equal.  ENT: Moist oral mucosa. Trachea midline, no adenopathy, neck supple. Cardiovascular: Regular rhythm, normal S1, S2. No murmur. No edema in lower extremities  Respiratory: Not using accessory muscles. Good inspiratory effort. Clear to auscultation bilaterally, no wheeze or crackles. GI: Abdomen soft, no tenderness, not distended, normal bowel sounds  Musculoskeletal: No deformity, ROM WNL. Neurology: CN 2-12 grossly intact. No speech or motor deficits  Psych: Normal affect. Alert and oriented in time, place and person  Skin: Warm, dry, normal turgor    Labs and Tests:  CBC:   Recent Labs     12/23/22  0617 12/24/22  1531 12/25/22  0529   WBC 15.3* 17.9* 14.0*   HGB 10.8* 10.9* 11.7*   HCT 33.3* 34.6* 36.3   MCV 90.5 91.2 89.2    217 233     BMP:    Recent Labs     12/23/22  0617 12/24/22  1531 12/25/22  0529   * 142 143   K 4.5 4.0 4.1   * 106 104   CO2 25 27 28   BUN 44* 24* 22*   CREATININE 1.0 0.7 0.7   GLUCOSE 168* 91 86     Hepatic:   Recent Labs     12/22/22  1844   AST 14*   ALT 6*   BILITOT 0.4   ALKPHOS 62         Problem List  Principal Problem:    Sepsis ([address]  Active Problems:    UTI (urinary tract infection)    HTN (hypertension)    Hypothyroidism    (HFpEF) heart failure with preserved ejection fraction (HCC)    Acute respiratory failure with hypoxia (HCC)    Dementia (HCC)    COPD (chronic obstructive pulmonary disease) ([facility] [address]    COVID-19  Resolved Problems:    * No resolved hospital problems. *       Assessment & Plan:     Sepsis: No fever in last 24 hours, leukocytosis improving , negative  blood cultures so far, continue empiric ceftriaxone.   MRSA screening panel come back positive, continue contact precautions  COVID-19: Continue contact and droplet precautions, supplemental oxygen as needed, continue inhaler treatment, not on dexamethasone, currently on 2L per NC  Hypernatremia: Resolved. Hypophosphatemia: We will replace. Compensated diastolic heart failure: proBNP 1450, chest x-ray clear showed no congestion. No peripheral edema noted  Pyuria: Continue ceftriaxone, urine culture pending  Deconditioning: PT OT consulted, speech therapy recommending puréed diet        Diet: ADULT DIET; Dysphagia - Pureed  ADULT ORAL NUTRITION SUPPLEMENT; Breakfast, Dinner; Standard High Calorie/High Protein Oral Supplement  ADULT ORAL NUTRITION SUPPLEMENT; Lunch;  Fortified Pudding Oral Supplement  ADULT ORAL NUTRITION SUPPLEMENT; Dinner; Frozen Oral Supplement  Code:Full Code  DVT PPX lovenox       Geraldine Patel MD   12/25/2022 8:36 AM

## 2022-12-25 NOTE — PLAN OF CARE
Problem: Discharge Planning  Goal: Discharge to home or other facility with appropriate resources  12/25/2022 1318 by Margarita Chance RN  Outcome: Progressing     Problem: Pain  Goal: Verbalizes/displays adequate comfort level or baseline comfort level  12/25/2022 1318 by Margarita Chance RN  Outcome: Progressing     Problem: Neurosensory - Adult  Goal: Achieves stable or improved neurological status  12/25/2022 1318 by Margarita Chance RN  Outcome: Progressing     Problem: Neurosensory - Adult  Goal: Absence of seizures  12/25/2022 1318 by Margarita Chance RN  Outcome: Progressing     Problem: Neurosensory - Adult  Goal: Remains free of injury related to seizures activity  12/25/2022 1318 by Margarita Chance RN  Outcome: Progressing     Problem: Neurosensory - Adult  Goal: Achieves maximal functionality and self care  12/25/2022 1318 by Margarita Chance RN  Outcome: Progressing     Problem: Respiratory - Adult  Goal: Achieves optimal ventilation and oxygenation  12/25/2022 1318 by Margarita Chance RN  Outcome: Progressing     Problem: Cardiovascular - Adult  Goal: Maintains optimal cardiac output and hemodynamic stability  12/25/2022 1318 by Margarita Chance RN  Outcome: Progressing     Problem: Cardiovascular - Adult  Goal: Absence of cardiac dysrhythmias or at baseline  12/25/2022 1318 by Margarita Chance RN  Outcome: Progressing     Problem: Skin/Tissue Integrity - Adult  Goal: Skin integrity remains intact  12/25/2022 1318 by Margarita Chance RN  Outcome: Progressing     Problem: Skin/Tissue Integrity - Adult  Goal: Incisions, wounds, or drain sites healing without S/S of infection  12/25/2022 1318 by Margarita Chance RN  Outcome: Progressing     Problem: Skin/Tissue Integrity - Adult  Goal: Oral mucous membranes remain intact  12/25/2022 1318 by Margarita Chance RN  Outcome: Progressing     Problem: Nutrition Deficit:  Goal: Optimize nutritional status  12/25/2022 1318 by Margarita Chance RN  Outcome: Progressing  12/25/2022 0618 by Yuliana Arrieta RN  Outcome: Not Progressing

## 2022-12-25 NOTE — PROGRESS NOTES
Patient refused morning medications. When I tried to have her drink her protein shake, she screamed and would not swallow it. Patient did not eat any of her breakfast either. Her mouth is still super dry, strawberry looking. I did oral care with her this morning, she pushed my hand away and started screaming, so I did not finish oral care.

## 2022-12-25 NOTE — PLAN OF CARE
Pt at less than 25% of dinner & refused PO medications & PO liquids; pt's tongue is rough & strawberry in color with dry lips & gums; oral care with mouth moisturizer done throughout evening, as pt tolerated. Pt is able to answer short questions. Pt stated \"look at the little boy over there\" and pointed to the whiteboard in room. Pt does not make any attempt to leave bed; increased frequency of rounds in place for pt safety. Pt is on specialty air mattress to promote skin integrity. VSS.       Problem: Nutrition Deficit:  Goal: Optimize nutritional status  Outcome: Not Progressing       Problem: Discharge Planning  Goal: Discharge to home or other facility with appropriate resources  Outcome: Progressing     Problem: Pain  Goal: Verbalizes/displays adequate comfort level or baseline comfort level  Outcome: Progressing     Problem: Neurosensory - Adult  Goal: Achieves stable or improved neurological status  Outcome: Progressing  Goal: Absence of seizures  Outcome: Progressing  Goal: Remains free of injury related to seizures activity  Outcome: Progressing  Goal: Achieves maximal functionality and self care  Outcome: Progressing     Problem: Respiratory - Adult  Goal: Achieves optimal ventilation and oxygenation  Outcome: Progressing     Problem: Cardiovascular - Adult  Goal: Maintains optimal cardiac output and hemodynamic stability  Outcome: Progressing  Goal: Absence of cardiac dysrhythmias or at baseline  Outcome: Progressing     Problem: Skin/Tissue Integrity - Adult  Goal: Skin integrity remains intact  Outcome: Progressing  Goal: Incisions, wounds, or drain sites healing without S/S of infection  Outcome: Progressing  Goal: Oral mucous membranes remain intact  Outcome: Progressing     Problem: Musculoskeletal - Adult  Goal: Return mobility to safest level of function  Outcome: Progressing  Goal: Maintain proper alignment of affected body part  Outcome: Progressing  Goal: Return ADL status to a safe level of function  Outcome: Progressing     Problem: Genitourinary - Adult  Goal: Absence of urinary retention  Outcome: Progressing  Goal: Urinary catheter remains patent  Outcome: Progressing     Problem: Infection - Adult  Goal: Absence of infection at discharge  Outcome: Progressing  Goal: Absence of infection during hospitalization  Outcome: Progressing  Goal: Absence of fever/infection during anticipated neutropenic period  Outcome: Progressing     Problem: Skin/Tissue Integrity  Goal: Absence of new skin breakdown  Description: 1.  Monitor for areas of redness and/or skin breakdown  2.  Assess vascular access sites hourly  3.  Every 4-6 hours minimum:  Change oxygen saturation probe site  4.  Every 4-6 hours:  If on nasal continuous positive airway pressure, respiratory therapy assess nares and determine need for appliance change or resting period.  Outcome: Progressing     Problem: Safety - Adult  Goal: Free from fall injury  Outcome: Progressing     Problem: ABCDS Injury Assessment  Goal: Absence of physical injury  Outcome: Progressing

## 2022-12-26 LAB
ANION GAP SERPL CALCULATED.3IONS-SCNC: 12 MMOL/L (ref 3–16)
BLOOD CULTURE, ROUTINE: NORMAL
BUN BLDV-MCNC: 16 MG/DL (ref 7–20)
CALCIUM SERPL-MCNC: 8.5 MG/DL (ref 8.3–10.6)
CHLORIDE BLD-SCNC: 106 MMOL/L (ref 99–110)
CO2: 25 MMOL/L (ref 21–32)
CREAT SERPL-MCNC: 0.6 MG/DL (ref 0.6–1.2)
CULTURE, BLOOD 2: NORMAL
GFR SERPL CREATININE-BSD FRML MDRD: >60 ML/MIN/{1.73_M2}
GLUCOSE BLD-MCNC: 106 MG/DL (ref 70–99)
GLUCOSE BLD-MCNC: 117 MG/DL (ref 70–99)
GLUCOSE BLD-MCNC: 155 MG/DL (ref 70–99)
GLUCOSE BLD-MCNC: 97 MG/DL (ref 70–99)
HCT VFR BLD CALC: 34.8 % (ref 36–48)
HEMOGLOBIN: 11.2 G/DL (ref 12–16)
MAGNESIUM: 1.7 MG/DL (ref 1.8–2.4)
MCH RBC QN AUTO: 29.8 PG (ref 26–34)
MCHC RBC AUTO-ENTMCNC: 32.3 G/DL (ref 31–36)
MCV RBC AUTO: 92.3 FL (ref 80–100)
PDW BLD-RTO: 15.2 % (ref 12.4–15.4)
PERFORMED ON: ABNORMAL
PERFORMED ON: ABNORMAL
PERFORMED ON: NORMAL
PHOSPHORUS: 2.9 MG/DL (ref 2.5–4.9)
PLATELET # BLD: 239 K/UL (ref 135–450)
PMV BLD AUTO: 8.8 FL (ref 5–10.5)
POTASSIUM SERPL-SCNC: 3.8 MMOL/L (ref 3.5–5.1)
RBC # BLD: 3.77 M/UL (ref 4–5.2)
SODIUM BLD-SCNC: 143 MMOL/L (ref 136–145)
WBC # BLD: 10.6 K/UL (ref 4–11)

## 2022-12-26 PROCEDURE — 2580000003 HC RX 258: Performed by: STUDENT IN AN ORGANIZED HEALTH CARE EDUCATION/TRAINING PROGRAM

## 2022-12-26 PROCEDURE — 85027 COMPLETE CBC AUTOMATED: CPT

## 2022-12-26 PROCEDURE — 2060000000 HC ICU INTERMEDIATE R&B

## 2022-12-26 PROCEDURE — 83735 ASSAY OF MAGNESIUM: CPT

## 2022-12-26 PROCEDURE — 6360000002 HC RX W HCPCS: Performed by: PHYSICIAN ASSISTANT

## 2022-12-26 PROCEDURE — 36415 COLL VENOUS BLD VENIPUNCTURE: CPT

## 2022-12-26 PROCEDURE — 80048 BASIC METABOLIC PNL TOTAL CA: CPT

## 2022-12-26 PROCEDURE — 2580000003 HC RX 258: Performed by: PHYSICIAN ASSISTANT

## 2022-12-26 PROCEDURE — 6360000002 HC RX W HCPCS: Performed by: HOSPITALIST

## 2022-12-26 PROCEDURE — 84100 ASSAY OF PHOSPHORUS: CPT

## 2022-12-26 RX ORDER — DEXTROSE MONOHYDRATE 100 MG/ML
INJECTION, SOLUTION INTRAVENOUS CONTINUOUS PRN
Status: DISCONTINUED | OUTPATIENT
Start: 2022-12-26 | End: 2022-12-27

## 2022-12-26 RX ORDER — ORPHENADRINE CITRATE 30 MG/ML
60 INJECTION INTRAMUSCULAR; INTRAVENOUS ONCE
Status: COMPLETED | OUTPATIENT
Start: 2022-12-26 | End: 2022-12-26

## 2022-12-26 RX ORDER — MAGNESIUM SULFATE IN WATER 40 MG/ML
4000 INJECTION, SOLUTION INTRAVENOUS ONCE
Status: COMPLETED | OUTPATIENT
Start: 2022-12-26 | End: 2022-12-26

## 2022-12-26 RX ORDER — MORPHINE SULFATE 2 MG/ML
1 INJECTION, SOLUTION INTRAMUSCULAR; INTRAVENOUS EVERY 4 HOURS PRN
Status: DISCONTINUED | OUTPATIENT
Start: 2022-12-26 | End: 2022-12-29

## 2022-12-26 RX ADMIN — SODIUM CHLORIDE 25 ML: 9 INJECTION, SOLUTION INTRAVENOUS at 10:00

## 2022-12-26 RX ADMIN — SODIUM CHLORIDE, PRESERVATIVE FREE 10 ML: 5 INJECTION INTRAVENOUS at 03:44

## 2022-12-26 RX ADMIN — DEXTROSE AND SODIUM CHLORIDE: 5; 450 INJECTION, SOLUTION INTRAVENOUS at 14:45

## 2022-12-26 RX ADMIN — MORPHINE SULFATE 1 MG: 2 INJECTION, SOLUTION INTRAMUSCULAR; INTRAVENOUS at 16:32

## 2022-12-26 RX ADMIN — Medication 10 ML: at 10:11

## 2022-12-26 RX ADMIN — MORPHINE SULFATE 1 MG: 2 INJECTION, SOLUTION INTRAMUSCULAR; INTRAVENOUS at 03:43

## 2022-12-26 RX ADMIN — ORPHENADRINE CITRATE 60 MG: 30 INJECTION INTRAMUSCULAR; INTRAVENOUS at 22:38

## 2022-12-26 RX ADMIN — MAGNESIUM SULFATE HEPTAHYDRATE 4000 MG: 40 INJECTION, SOLUTION INTRAVENOUS at 10:07

## 2022-12-26 ASSESSMENT — PAIN SCALES - PAIN ASSESSMENT IN ADVANCED DEMENTIA (PAINAD)
TOTALSCORE: 8
BODYLANGUAGE: 0
BREATHING: 1
FACIALEXPRESSION: 2
BODYLANGUAGE: 1
CONSOLABILITY: 1
BODYLANGUAGE: 1
FACIALEXPRESSION: 0
NEGVOCALIZATION: 2
TOTALSCORE: 6
NEGVOCALIZATION: 0
CONSOLABILITY: 2
FACIALEXPRESSION: 1
BREATHING: 1
TOTALSCORE: 2
BREATHING: 1
NEGVOCALIZATION: 2
CONSOLABILITY: 1

## 2022-12-26 ASSESSMENT — PAIN SCALES - GENERAL: PAINLEVEL_OUTOF10: 10

## 2022-12-26 NOTE — PROGRESS NOTES
Nutrition Note    RECOMMENDATIONS  PO Diet: dysphagia pureed  ONS: Ensure / Magic Cup / Boost Pudding  Nutrition Support: recommend tube feed if aggressive tx desired; consult RD for order and manage. NUTRITION ASSESSMENT   Pt remains nutritionally compromised AEB inadequate oral intake over length of admission and prior to admission. Diet continues as dysphagia pureed and pt is a total feed. 0% consumed at all three meals on 12/25. RN attempted to feed pt this morning and offered Ensure. Pt with dry mouth, tried Ensure, but refussed to consume. Pt remains a full code, recommend nutrition support if aggressive tx desired. Nutrition Related Findings: oriented to person only; Mg 1.70; contractures: R & L hand, R & L foot  Wounds: Multiple, Deep Tissue Injury, Unstageable  Nutrition Education:  Education not indicated   Nutrition Goals: PO intake 50% or greater     MALNUTRITION ASSESSMENT   Chronic Illness  Malnutrition Status: Insufficient data (at risk)      NUTRITION DIAGNOSIS   Inadequate oral intake related to inadequate protein-energy intake as evidenced by intake 0-25%  Increased nutrient needs related to increase demand for energy/nutrients as evidenced by wounds      CURRENT NUTRITION THERAPIES  ADULT DIET; Dysphagia - Pureed  ADULT ORAL NUTRITION SUPPLEMENT; Breakfast, Dinner; Standard High Calorie/High Protein Oral Supplement  ADULT ORAL NUTRITION SUPPLEMENT; Lunch;  Fortified Pudding Oral Supplement  ADULT ORAL NUTRITION SUPPLEMENT; Dinner; Frozen Oral Supplement     PO Intake: 0%   PO Supplement Intake:0%    Additional Calorie Sources:  D5% / .45% NS at 75 mL/hr provides 306 calories from dextrose    ANTHROPOMETRICS  Current Height: 5' 4\" (162.6 cm)  Current Weight: 105 lb (47.6 kg)    Ideal Body Weight (IBW): 120 lbs  (55 kg)        BMI: 18      COMPARATIVE STANDARDS  Energy (kcal):  1440 - 1680     Protein (g):  72 - 96       Fluid (mL/day):  1440 - 1680    The patient will be monitored per nutrition standards of care. Consult dietitian if additional nutrition interventions are needed prior to RD reassessment.      Jackson Rainey RD, LD    Contact: [phone]

## 2022-12-26 NOTE — PLAN OF CARE
Problem: Discharge Planning  Goal: Discharge to home or other facility with appropriate resources  12/26/2022 0133 by Angela Gleason RN  Outcome: Progressing  12/25/2022 1318 by Jonny Dyer RN  Outcome: Progressing     Problem: Pain  Goal: Verbalizes/displays adequate comfort level or baseline comfort level  12/26/2022 0133 by Angela Gleason RN  Outcome: Progressing  12/25/2022 1318 by Jonny Dyer RN  Outcome: Progressing     Problem: Neurosensory - Adult  Goal: Achieves stable or improved neurological status  12/26/2022 0133 by Angela Gleason RN  Outcome: Progressing  12/25/2022 1318 by Jonny Dyer RN  Outcome: Progressing  Goal: Absence of seizures  12/26/2022 0133 by Angela Gleason RN  Outcome: Progressing  12/25/2022 1318 by Jonny Dyer RN  Outcome: Progressing  Goal: Remains free of injury related to seizures activity  12/26/2022 0133 by Angela Gleason RN  Outcome: Progressing  12/25/2022 1318 by Jonny Dyer RN  Outcome: Progressing  Goal: Achieves maximal functionality and self care  12/26/2022 0133 by Angela Gleason RN  Outcome: Progressing  12/25/2022 1318 by Jonny Dyer RN  Outcome: Progressing     Problem: Respiratory - Adult  Goal: Achieves optimal ventilation and oxygenation  12/26/2022 0133 by Angela Gleason RN  Outcome: Progressing  12/25/2022 1318 by Jonny Dyer RN  Outcome: Progressing     Problem: Cardiovascular - Adult  Goal: Maintains optimal cardiac output and hemodynamic stability  12/26/2022 0133 by Angela Gleason RN  Outcome: Progressing  12/25/2022 1318 by Jonny Dyer RN  Outcome: Progressing  Goal: Absence of cardiac dysrhythmias or at baseline  12/26/2022 0133 by Angela Gleason RN  Outcome: Progressing  12/25/2022 1318 by Jonny Dyer RN  Outcome: Progressing     Problem: Skin/Tissue Integrity - Adult  Goal: Skin integrity remains intact  12/26/2022 0133 by Angela Gleason RN  Outcome: Progressing  12/25/2022 1318 by Jonny Dyer RN  Outcome: Progressing  Goal: Incisions, wounds, or drain sites healing without S/S of infection  12/26/2022 0133 by Toby Tse RN  Outcome: Progressing  12/25/2022 1318 by Davey Dallas RN  Outcome: Progressing  Goal: Oral mucous membranes remain intact  12/26/2022 0133 by Toby Tse RN  Outcome: Progressing  12/25/2022 1318 by Davey Dallas RN  Outcome: Progressing     Problem: Musculoskeletal - Adult  Goal: Return mobility to safest level of function  12/26/2022 0133 by Toby Tse RN  Outcome: Progressing  12/25/2022 1318 by Davey Dallas RN  Outcome: Progressing  Goal: Maintain proper alignment of affected body part  12/26/2022 0133 by Toby Tse RN  Outcome: Progressing  12/25/2022 1318 by Davey Dallas RN  Outcome: Progressing  Goal: Return ADL status to a safe level of function  12/26/2022 0133 by Toby Tse RN  Outcome: Progressing  12/25/2022 1318 by Davey Dallas RN  Outcome: Progressing     Problem: Genitourinary - Adult  Goal: Absence of urinary retention  12/26/2022 0133 by Toby Tse RN  Outcome: Progressing  12/25/2022 1318 by Davey Dallas RN  Outcome: Progressing  Goal: Urinary catheter remains patent  12/26/2022 0133 by Toby Tse RN  Outcome: Progressing  12/25/2022 1318 by Davey Dallas RN  Outcome: Progressing     Problem: Infection - Adult  Goal: Absence of infection at discharge  12/26/2022 0133 by Toby Tse RN  Outcome: Progressing  12/25/2022 1318 by Davey Dallas RN  Outcome: Progressing  Goal: Absence of infection during hospitalization  12/26/2022 0133 by Toby Tse RN  Outcome: Progressing  12/25/2022 1318 by Davey Dallas RN  Outcome: Progressing  Goal: Absence of fever/infection during anticipated neutropenic period  12/26/2022 0133 by Toby Tse RN  Outcome: Progressing  12/25/2022 1318 by Davey Dallas RN  Outcome: Progressing     Problem: Skin/Tissue Integrity  Goal: Absence of new skin breakdown  Description: 1.   Monitor for areas of redness and/or skin breakdown  2. Assess vascular access sites hourly  3. Every 4-6 hours minimum:  Change oxygen saturation probe site  4. Every 4-6 hours:  If on nasal continuous positive airway pressure, respiratory therapy assess nares and determine need for appliance change or resting period. 12/26/2022 0133 by Shanon Link RN  Outcome: Progressing  12/25/2022 1318 by Yaz Pantoja RN  Outcome: Progressing     Problem: Safety - Adult  Goal: Free from fall injury  12/26/2022 0133 by Shanon Link RN  Outcome: Progressing  12/25/2022 1318 by Yaz Pantoja RN  Outcome: Progressing     Problem: ABCDS Injury Assessment  Goal: Absence of physical injury  12/26/2022 0133 by Shanon Link RN  Outcome: Progressing  12/25/2022 1318 by Yaz Pantoja RN  Outcome: Progressing     Problem: Nutrition Deficit:  Goal: Optimize nutritional status  12/26/2022 0133 by Shanon Link RN  Outcome: Progressing  12/25/2022 1318 by Yaz Pantoja RN  Outcome: Progressing   Pt still refusing to eat or take any medications PO.

## 2022-12-26 NOTE — PROGRESS NOTES
Alerted by nursing staff regarding blood sugars being in the 70s and patient refusing to eat/drink/take meds. Will start gentle fluids.      Laron Portillo PA-C

## 2022-12-26 NOTE — PROGRESS NOTES
[facility]    12/26/2022 8:35 AM        Name: Tony Shah . Admitted: 12/22/2022  Primary Care Provider: No primary care provider on file. (Tel: None)                        Subjective:  . No acute events overnight. Resting well. Pain control. Diet ok. Labs reviewed  Denies any chest pain sob. Reviewed interval ancillary notes    Current Medications  magnesium sulfate 4000 mg in 100 mL IVPB premix, Once  morphine (PF) injection 1 mg, Q4H PRN  dextrose 5 % and 0.45 % sodium chloride infusion, Continuous  enoxaparin Sodium (LOVENOX) injection 30 mg, Daily  albuterol sulfate HFA (PROVENTIL;VENTOLIN;PROAIR) 108 (90 Base) MCG/ACT inhaler 2 puff, PRN  levothyroxine (SYNTHROID) tablet 112 mcg, Daily  melatonin tablet 6 mg, Nightly PRN  QUEtiapine (SEROQUEL) tablet 25 mg, Nightly  tamsulosin (FLOMAX) capsule 0.4 mg, Nightly  sodium chloride flush 0.9 % injection 5-40 mL, 2 times per day  sodium chloride flush 0.9 % injection 5-40 mL, PRN  0.9 % sodium chloride infusion, PRN  ondansetron (ZOFRAN-ODT) disintegrating tablet 4 mg, Q8H PRN   Or  ondansetron (ZOFRAN) injection 4 mg, Q6H PRN  polyethylene glycol (GLYCOLAX) packet 17 g, Daily PRN  acetaminophen (TYLENOL) tablet 650 mg, Q6H PRN   Or  acetaminophen (TYLENOL) suppository 650 mg, Q6H PRN        Objective:  BP (!) 171/63   Pulse 75   Temp (!) 96.6 °F (35.9 °C)   Resp 17   Ht 5' 4\" (1.626 m)   Wt 105 lb (47.6 kg)   SpO2 91%   BMI 18.02 kg/m²     Intake/Output Summary (Last 24 hours) at 12/26/2022 0835  Last data filed at 12/26/2022 0651  Gross per 24 hour   Intake 1004.25 ml   Output 550 ml   Net 454.25 ml      Wt Readings from Last 3 Encounters:   12/26/22 105 lb (47.6 kg)   09/30/22 128 lb 4.9 oz (58.2 kg)       General appearance:  Appears comfortable  Eyes: Sclera clear.  Pupils equal.  ENT: Moist oral mucosa. Trachea midline, no adenopathy, neck supple. Cardiovascular: Regular rhythm, normal S1, S2. No murmur. No edema in lower extremities  Respiratory: Not using accessory muscles. Good inspiratory effort. Clear to auscultation bilaterally, no wheeze or crackles. GI: Abdomen soft, no tenderness, not distended, normal bowel sounds  Musculoskeletal: No deformity, ROM WNL. Neurology: CN 2-12 grossly intact. No speech or motor deficits  Psych: Normal affect. Alert and oriented in time, place and person  Skin: Warm, dry, normal turgor    Labs and Tests:  CBC:   Recent Labs     12/24/22  1531 12/25/22  0529 12/26/22  0552   WBC 17.9* 14.0* 10.6   HGB 10.9* 11.7* 11.2*   HCT 34.6* 36.3 34.8*   MCV 91.2 89.2 92.3    233 239     BMP:    Recent Labs     12/24/22  1531 12/25/22  0529 12/26/22  0552    143 143   K 4.0 4.1 3.8    104 106   CO2 27 28 25   BUN 24* 22* 16   CREATININE 0.7 0.7 0.6   GLUCOSE 91 86 106*     Hepatic: No results for input(s): AST, ALT, ALB, BILITOT, ALKPHOS in the last 72 hours. Problem List  Principal Problem:    [facility] ([facility]  Active Problems:    UTI (urinary tract infection)    HTN (hypertension)    Hypothyroidism    (HFpEF) heart failure with preserved ejection fraction (HCC)    Acute respiratory failure with hypoxia (HCC)    Dementia (HCC)    COPD (chronic obstructive pulmonary disease) ([facility]    COVID-19  Resolved Problems:    * No resolved hospital problems. *       Assessment & Plan:   Sepsis: No fever in last 24 hours, leukocytosis improving , negative  blood cultures so far, continue empiric ceftriaxone. MRSA screening panel come back positive, continue contact precautions  COVID-19: Continue contact and droplet precautions, supplemental oxygen as needed, continue inhaler treatment, not on dexamethasone, currently on 2L per NC  Hypernatremia: Resolved. Hypomagnesemia: We will replace.   Compensated diastolic heart failure: proBNP 1450, chest x-ray clear showed no congestion. No peripheral edema noted  Pyuria: Continue ceftriaxone, urine culture pending  Deconditioning: PT OT consulted, speech therapy recommending puréed diet, patient refusing to eat and blood sugar dropped this morning and last night,  will consult palliative care for further recommendations. Diet: ADULT DIET; Dysphagia - Pureed  ADULT ORAL NUTRITION SUPPLEMENT; Breakfast, Dinner; Standard High Calorie/High Protein Oral Supplement  ADULT ORAL NUTRITION SUPPLEMENT; Lunch;  Fortified Pudding Oral Supplement  ADULT ORAL NUTRITION SUPPLEMENT; Dinner; Frozen Oral Supplement  Code:Full Code  DVT PPX pierce Ness MD   12/26/2022 8:35 AM

## 2022-12-27 LAB
ANION GAP SERPL CALCULATED.3IONS-SCNC: 13 MMOL/L (ref 3–16)
BUN BLDV-MCNC: 15 MG/DL (ref 7–20)
CALCIUM SERPL-MCNC: 9.2 MG/DL (ref 8.3–10.6)
CHLORIDE BLD-SCNC: 102 MMOL/L (ref 99–110)
CO2: 25 MMOL/L (ref 21–32)
CREAT SERPL-MCNC: 0.7 MG/DL (ref 0.6–1.2)
GFR SERPL CREATININE-BSD FRML MDRD: >60 ML/MIN/{1.73_M2}
GLUCOSE BLD-MCNC: 118 MG/DL (ref 70–99)
GLUCOSE BLD-MCNC: 138 MG/DL (ref 70–99)
GLUCOSE BLD-MCNC: 80 MG/DL (ref 70–99)
GLUCOSE BLD-MCNC: 89 MG/DL (ref 70–99)
GLUCOSE BLD-MCNC: 95 MG/DL (ref 70–99)
GLUCOSE BLD-MCNC: 96 MG/DL (ref 70–99)
GLUCOSE BLD-MCNC: 97 MG/DL (ref 70–99)
GLUCOSE BLD-MCNC: 98 MG/DL (ref 70–99)
HCT VFR BLD CALC: 39.7 % (ref 36–48)
HEMOGLOBIN: 12.5 G/DL (ref 12–16)
MCH RBC QN AUTO: 28.1 PG (ref 26–34)
MCHC RBC AUTO-ENTMCNC: 31.6 G/DL (ref 31–36)
MCV RBC AUTO: 88.9 FL (ref 80–100)
PDW BLD-RTO: 14.8 % (ref 12.4–15.4)
PERFORMED ON: ABNORMAL
PERFORMED ON: ABNORMAL
PERFORMED ON: NORMAL
PLATELET # BLD: 288 K/UL (ref 135–450)
PMV BLD AUTO: 8.6 FL (ref 5–10.5)
POTASSIUM SERPL-SCNC: 3.8 MMOL/L (ref 3.5–5.1)
RBC # BLD: 4.47 M/UL (ref 4–5.2)
SODIUM BLD-SCNC: 140 MMOL/L (ref 136–145)
WBC # BLD: 13.5 K/UL (ref 4–11)

## 2022-12-27 PROCEDURE — 80048 BASIC METABOLIC PNL TOTAL CA: CPT

## 2022-12-27 PROCEDURE — 36415 COLL VENOUS BLD VENIPUNCTURE: CPT

## 2022-12-27 PROCEDURE — 6370000000 HC RX 637 (ALT 250 FOR IP): Performed by: NURSE PRACTITIONER

## 2022-12-27 PROCEDURE — 2060000000 HC ICU INTERMEDIATE R&B

## 2022-12-27 PROCEDURE — 85027 COMPLETE CBC AUTOMATED: CPT

## 2022-12-27 RX ORDER — MORPHINE SULFATE 20 MG/ML
5 SOLUTION ORAL
Status: DISCONTINUED | OUTPATIENT
Start: 2022-12-27 | End: 2022-12-29

## 2022-12-27 RX ORDER — LORAZEPAM 2 MG/ML
1 CONCENTRATE ORAL
Status: DISCONTINUED | OUTPATIENT
Start: 2022-12-27 | End: 2022-12-29

## 2022-12-27 RX ADMIN — Medication 1 MG: at 20:58

## 2022-12-27 RX ADMIN — MORPHINE SULFATE 5 MG: 10 SOLUTION ORAL at 18:35

## 2022-12-27 ASSESSMENT — PAIN SCALES - PAIN ASSESSMENT IN ADVANCED DEMENTIA (PAINAD)
NEGVOCALIZATION: 1
BREATHING: 1
TOTALSCORE: 7
CONSOLABILITY: 1
FACIALEXPRESSION: 1
FACIALEXPRESSION: 1
CONSOLABILITY: 2
BODYLANGUAGE: 1
FACIALEXPRESSION: 1
CONSOLABILITY: 0
BODYLANGUAGE: 1
TOTALSCORE: 5
TOTALSCORE: 2
NEGVOCALIZATION: 1
BODYLANGUAGE: 0
BREATHING: 1
NEGVOCALIZATION: 2
BREATHING: 0

## 2022-12-27 ASSESSMENT — PAIN SCALES - GENERAL: PAINLEVEL_OUTOF10: 10

## 2022-12-27 NOTE — CARE COORDINATION
SW was informed by Palliative Care at [facility], patient will be referred to [facility] for information and possible discharge back to [address] [place] [address]/hospice. Will continue to follow.     Electronically signed by NILDA Lou, LOGAN on 12/27/2022 at 4:34 PM

## 2022-12-27 NOTE — PLAN OF CARE
Problem: Discharge Planning  Goal: Discharge to home or other facility with appropriate resources  Outcome: Progressing     Problem: Pain  Goal: Verbalizes/displays adequate comfort level or baseline comfort level  Outcome: Progressing     Problem: Neurosensory - Adult  Goal: Achieves stable or improved neurological status  Outcome: Progressing  Goal: Absence of seizures  Outcome: Progressing  Goal: Remains free of injury related to seizures activity  Outcome: Progressing  Goal: Achieves maximal functionality and self care  Outcome: Progressing     Problem: Respiratory - Adult  Goal: Achieves optimal ventilation and oxygenation  Outcome: Progressing     Problem: Cardiovascular - Adult  Goal: Maintains optimal cardiac output and hemodynamic stability  Outcome: Progressing  Goal: Absence of cardiac dysrhythmias or at baseline  Outcome: Progressing     Problem: Skin/Tissue Integrity - Adult  Goal: Skin integrity remains intact  Outcome: Progressing  Goal: Incisions, wounds, or drain sites healing without S/S of infection  Outcome: Progressing  Goal: Oral mucous membranes remain intact  Outcome: Progressing     Problem: Musculoskeletal - Adult  Goal: Return mobility to safest level of function  Outcome: Progressing  Goal: Maintain proper alignment of affected body part  Outcome: Progressing  Goal: Return ADL status to a safe level of function  Outcome: Progressing     Problem: Genitourinary - Adult  Goal: Absence of urinary retention  Outcome: Progressing  Goal: Urinary catheter remains patent  Outcome: Progressing     Problem: Infection - Adult  Goal: Absence of infection at discharge  Outcome: Progressing  Goal: Absence of infection during hospitalization  Outcome: Progressing  Goal: Absence of fever/infection during anticipated neutropenic period  Outcome: Progressing     Problem: Skin/Tissue Integrity  Goal: Absence of new skin breakdown  Description: 1. Monitor for areas of redness and/or skin breakdown  2. Assess vascular access sites hourly  3. Every 4-6 hours minimum:  Change oxygen saturation probe site  4. Every 4-6 hours:  If on nasal continuous positive airway pressure, respiratory therapy assess nares and determine need for appliance change or resting period.   Outcome: Progressing     Problem: Safety - Adult  Goal: Free from fall injury  Outcome: Progressing     Problem: ABCDS Injury Assessment  Goal: Absence of physical injury  Outcome: Progressing     Problem: Nutrition Deficit:  Goal: Optimize nutritional status  Outcome: Progressing

## 2022-12-27 NOTE — PROGRESS NOTES
[facility]    12/27/2022 9:48 AM        Name: Jazzy Lopez . Admitted: 12/22/2022  Primary Care Provider: No primary care provider on file. (Tel: None)      Brief History: Hx advanced dementia. Presented from [address] [place] [address] with shortness of breath, had recent positive COVID test at facility. Admitted with UTI, COVID, and sepsis. Subjective:  Limited secondary to mental status. Patient resting in bed, awake, makes eye contact. Verbal but speech unintelligible. Appears comfortable.      Reviewed interval ancillary notes    Current Medications  dextrose bolus 10% 125 mL, PRN   Or  dextrose bolus 10% 250 mL, PRN  glucagon (rDNA) injection 1 mg, PRN  dextrose 10 % infusion, Continuous PRN  morphine injection 1 mg, Q4H PRN  dextrose 5 % and 0.45 % sodium chloride infusion, Continuous  enoxaparin Sodium (LOVENOX) injection 30 mg, Daily  albuterol sulfate HFA (PROVENTIL;VENTOLIN;PROAIR) 108 (90 Base) MCG/ACT inhaler 2 puff, PRN  levothyroxine (SYNTHROID) tablet 112 mcg, Daily  melatonin tablet 6 mg, Nightly PRN  QUEtiapine (SEROQUEL) tablet 25 mg, Nightly  tamsulosin (FLOMAX) capsule 0.4 mg, Nightly  sodium chloride flush 0.9 % injection 5-40 mL, 2 times per day  sodium chloride flush 0.9 % injection 5-40 mL, PRN  0.9 % sodium chloride infusion, PRN  ondansetron (ZOFRAN-ODT) disintegrating tablet 4 mg, Q8H PRN   Or  ondansetron (ZOFRAN) injection 4 mg, Q6H PRN  polyethylene glycol (GLYCOLAX) packet 17 g, Daily PRN  acetaminophen (TYLENOL) tablet 650 mg, Q6H PRN   Or  acetaminophen (TYLENOL) suppository 650 mg, Q6H PRN        Objective:  BP (!) 153/73   Pulse 64   Temp 97.8 °F (36.6 °C) (Oral)   Resp 14   Ht 5' 4\" (1.626 m)   Wt 112 lb 12.8 oz (51.2 kg)   SpO2 93%   BMI 19.36 kg/m²     Intake/Output Summary (Last 24 hours) at 12/27/2022 0948  Last data filed at 12/27/2022 0841  Gross per 24 hour   Intake 446.77 ml   Output 275 ml   Net 171.77 ml      Wt Readings from Last 3 Encounters:   12/27/22 112 lb 12.8 oz (51.2 kg)   09/30/22 128 lb 4.9 oz (58.2 kg)       General appearance:  Appears comfortable  ENT: Dry oral mucosa. Cardiovascular: Regular rhythm, normal S1, S2. No murmur. No edema in lower extremities  Respiratory: Not using accessory muscles. Clear to auscultation bilaterally, no wheeze or crackles. GI: Abdomen soft, no tenderness, not distended, normal bowel sounds  Neurology: Moves extremities spontaneously  Psych: Calm  Skin: Warm, dry    Labs and Tests:  CBC:   Recent Labs     12/25/22  0529 12/26/22  0552 12/27/22  0903   WBC 14.0* 10.6 13.5*   HGB 11.7* 11.2* 12.5    239 288     BMP:    Recent Labs     12/25/22  0529 12/26/22  0552 12/27/22  0903    143 140   K 4.1 3.8 3.8    106 102   CO2 28 25 25   BUN 22* 16 15   CREATININE 0.7 0.6 0.7   GLUCOSE 86 106* 98     Hepatic: No results for input(s): AST, ALT, ALB, BILITOT, ALKPHOS in the last 72 hours. CXR 12/22/2022:  Lungs are clear      Problem List  Principal Problem:    Sepsis ([facility]  Active Problems:    UTI (urinary tract infection)    HTN (hypertension)    Hypothyroidism    (HFpEF) heart failure with preserved ejection fraction (HCC)    Acute respiratory failure with hypoxia (HCC)    Dementia (HCC)    COPD (chronic obstructive pulmonary disease) ([facility]    COVID-19  Resolved Problems:    * No resolved hospital problems. *       Assessment & Plan:   Sepsis. Based on UTI, tachycardia, tachypnea, leukocytosis on admission. Afebrile past 24 hours, blood cultures with NGTD. Started on empiric ceftriaxone on admission. MRSA screening panel come back positive, continue contact precautions  COVID-19 / COPD / acute hypoxic respiratory failure. Rapid A/B negative, procalcitonin 0.11, COVID-19 positive (12/22), MRSA screen positive.  Continue contact and droplet precautions, supplemental oxygen as needed, continue inhaler treatment, not on dexamethasone, currently on 2L per NC. Hypernatremia. Sodium 148 on presentation. Likely secondary dehydration. Resolved with gentle hydration. Hypomagnesemia. Replaced. Check level in am.   Chronic dCHF. Appears compensated. proBNP 1450, CXR clear. No indication for diuretics. Pyuria. UA with moderate leukocytes, 2+ bacteria. Started on empiric ceftriaxone. Urine culture with NGTD. Failure to thrive. Patient refusing to eat, has been receiving gentle IV hydration. Palliative care consult pending. Patient most likely appropriate for hospice. Update to daughter Miah Peacock by phone, to discuss goals of care with sisters. Diet: ADULT DIET; Dysphagia - Pureed  ADULT ORAL NUTRITION SUPPLEMENT; Breakfast, Dinner; Standard High Calorie/High Protein Oral Supplement  ADULT ORAL NUTRITION SUPPLEMENT; Lunch;  Fortified Pudding Oral Supplement  ADULT ORAL NUTRITION SUPPLEMENT; Dinner; Frozen Oral Supplement  Code:Full Code  DVT PPX: GERA Fallon - CNP   12/27/2022 9:48 AM

## 2022-12-27 NOTE — PROGRESS NOTES
Pt was attempted to be put on oxygen multiple times thus far over the shift. Each time she screams yells starts to cry and rips it off. Pt also refused multiple times over this shift to allow anything at all to be given to her orally. Still unable to even get oral temperature. Pt still has no IV access and some of her meds to help her have been changed to IM to ensure that she is more comfortable and less anxious overnight. Pt remains without oxygen or continuous pulse oxygen monitoring. Pt remains on tele monitor and close to RN station. Pt will continue to be monitored as closely as she allows staff this shift.

## 2022-12-27 NOTE — CONSULTS
Palliative Care:        Shortness of Breath, Positive For Covid-19, and Other (FROM [facility] FROM [address] COU[facility]   ALSO PAINFUL BEDSORE ON BACKSIDE)    H&P: 81 y.o. female with PMHx of dementia, hypothyroidism, COPD who presented to [facility] with complaints of shortness of breath. Patient looks malnourished and is not really responsive on exam.  She reacts to pain, but does not follow commands. No family at bedside. Unable to get any history. Patient does have a history of dementia. History from the ER indicates patient was found to have positive COVID-19 test.  Unclear what duration she has been ill. She was hypoxic in the ER with oxygen saturation in the 80s on room air and was placed on supplemental oxygen. She does not use oxygen at baseline. Unable to obtain ROS given dementia and inability to participate in the exam.  Admit: 12/22/2022  Consult: 12/26/2022    Past Medical History:   has a past medical history of COPD (chronic obstructive pulmonary disease) ([facility] Dementia ([facility] Diverticulosis, Glaucoma, Hypertension, Hypothyroid, and Legal blindness. Past Surgical History:   has a past surgical history that includes eye surgery; Tonsillectomy; and joint replacement.     Advance Directives:        Advance Care Planning   The patient has the following advanced directives on file:  Advance Directives       Power of [address] Will ACP-Advance Directive ACP-Power of     Not on File Not on File Not on File Not on File     The patient has appointed the following active healthcare agents:    Primary Decision Maker: Fatou Hernández - Child - [phone]    Primary Decision Maker: Gaye Gonzáles - Child - [phone]    Primary Decision Maker: Tierra Casillas Child - [phone]    The Patient has the following current code status:    Code Status: Full Code    Extended Emergency Contact Information  Primary Emergency Contact: Jean Ornelas [address]894 Phone: [phone]  Relation: Child  Secondary Emergency Contact: eulogio ramirez  Home Phone: [phone]  Relation: Child    Problem Severity: Pain/Other Symptoms:        Weight 112#  Body mass index is 19.36 kg/m². Bed Mobility/Toileting/Transfer:        PT/OT 6/ no number    Symptom Assessment: Appetite/Nausea/Bowels/Fatigue:          Intake/Output Summary (Last 24 hours) at 12/27/2022 1057  Last data filed at 12/27/2022 0841  Gross per 24 hour   Intake 446.77 ml   Output 275 ml   Net 171.77 ml     ADULT DIET; Dysphagia - Pureed  ADULT ORAL NUTRITION SUPPLEMENT; Breakfast, Dinner; Standard High Calorie/High Protein Oral Supplement  ADULT ORAL NUTRITION SUPPLEMENT; Lunch; Fortified Pudding Oral Supplement  ADULT ORAL NUTRITION SUPPLEMENT; Dinner; Frozen Oral Supplement    Social History:   reports that she has quit smoking. Her smoking use included cigarettes. She has a 10.00 pack-year smoking history. She does not have any smokeless tobacco history on file. She reports that she does not drink alcohol and does not use drugs. Family History:  family history includes Coronary Art Dis in her father. Family Discussion:        All MD/RN notes and personal interaction indicate that pt is not capable of independent decision making. Pt has no ACP docs on file but has three daughters listed in EMR. LVMs for all three. Spoke via phone with pt's daughter Aleshia Miles who confirmed she is pt's HCPOA (document requested for EMR). Discussed with Aleshia Miles pt's current status including having removed IV access and Attending's question about placing midline vs comfort care. Also discussed likelihood of pt needing PEG (as disclosed by attending NP Alison Jiménez) and the implications of that decision. Aleshia Miles is confident that pt would not want permanent PEG or to be attached to a machine, even one for tube feeds.  Aleshia Miles is of the opinion that pt's care should focus on comfort at this time, even though it's in opposition to pt's prior stated wishes. However, at the conclusion of this conversation, Winston Oliva expressed a desire to hold off on updating any orders - code status or midline placement - until she had an opportunity to speak with her siblings. ADDENDUM 1430: Spoke via phone with pt's two daughters, Papo Lee and Michel Cerna, who confirmed that Winston Oliva is pt's HCPOA. Also, had lengthy discussion with them about pt's current status, POC, and prognosis, along with probable interventions to place midline and PEG, if they continue to desire aggressive care. Discussed at length the whole-body effects of dementia, the incurability, and the continued decline. Discussed pt's withdraw from interaction, her disinterest in eating, and limitations on learning and how it impacts her ability to improve any skills/functions she has. Discussed the path of aggressive interventions - pt likely receives midline for duration of hospital stay, gets PEG, starts tube feed (and increased to goal), then discharged to new facility who could provide PEG support; quick return to poor baseline, even with TF, and continued deterioration d/t dementia - vs path of comfort interventions - coordinating care for pt in comforting environment with a focus on only those interventions which would improve her comfort level. Discussed tools available in each scenario (with much deferral to CM expertise and insurance limitations), including those available through hospice. Both daughters had many, many questions and were interested in a referral to a hospice agency who could provide more information about logistics of getting pt to daughter's home. List provided, [facility] chosen. Referral order placed, faxed, confirmed. RN informed, no update to code status or d/c dispo. Will follow up and support patient/family as time allows or circumstances dictate. Please reach out via [phone], [employer], or email [email or web address] if pt condition changes significantly or if family requests support. Thank you.     Electronically signed by Madyson Curran RN, BSN on 12/27/2022 at 4:55 PM

## 2022-12-27 NOTE — CARE COORDINATION
Palliative Care:     Spoke with pt's daughters who confirmed that pt should be DNR-CC effective immediately. All aggressive interventions should be halted; only those interventions which would improve pt's comfort level should be retained or added. Hospice referral pending. D/C plans pending.

## 2022-12-28 LAB
GLUCOSE BLD-MCNC: 112 MG/DL (ref 70–99)
GLUCOSE BLD-MCNC: 67 MG/DL (ref 70–99)
GLUCOSE BLD-MCNC: 87 MG/DL (ref 70–99)
GLUCOSE BLD-MCNC: 89 MG/DL (ref 70–99)
GLUCOSE BLD-MCNC: 90 MG/DL (ref 70–99)
PERFORMED ON: ABNORMAL
PERFORMED ON: ABNORMAL
PERFORMED ON: NORMAL

## 2022-12-28 PROCEDURE — 36569 INSJ PICC 5 YR+ W/O IMAGING: CPT

## 2022-12-28 PROCEDURE — 2500000003 HC RX 250 WO HCPCS: Performed by: PHYSICIAN ASSISTANT

## 2022-12-28 PROCEDURE — 02HV33Z INSERTION OF INFUSION DEVICE INTO SUPERIOR VENA CAVA, PERCUTANEOUS APPROACH: ICD-10-PCS | Performed by: STUDENT IN AN ORGANIZED HEALTH CARE EDUCATION/TRAINING PROGRAM

## 2022-12-28 PROCEDURE — 6370000000 HC RX 637 (ALT 250 FOR IP): Performed by: NURSE PRACTITIONER

## 2022-12-28 PROCEDURE — 2580000003 HC RX 258: Performed by: NURSE PRACTITIONER

## 2022-12-28 PROCEDURE — C1751 CATH, INF, PER/CENT/MIDLINE: HCPCS

## 2022-12-28 PROCEDURE — 2060000000 HC ICU INTERMEDIATE R&B

## 2022-12-28 RX ORDER — SODIUM CHLORIDE 9 MG/ML
INJECTION, SOLUTION INTRAVENOUS CONTINUOUS
Status: DISCONTINUED | OUTPATIENT
Start: 2022-12-28 | End: 2022-12-28

## 2022-12-28 RX ORDER — DEXTROSE AND SODIUM CHLORIDE 5; .45 G/100ML; G/100ML
INJECTION, SOLUTION INTRAVENOUS CONTINUOUS
Status: DISCONTINUED | OUTPATIENT
Start: 2022-12-28 | End: 2022-12-30 | Stop reason: HOSPADM

## 2022-12-28 RX ORDER — LIDOCAINE HYDROCHLORIDE 10 MG/ML
5 INJECTION, SOLUTION EPIDURAL; INFILTRATION; INTRACAUDAL; PERINEURAL ONCE
Status: DISCONTINUED | OUTPATIENT
Start: 2022-12-28 | End: 2022-12-30 | Stop reason: HOSPADM

## 2022-12-28 RX ORDER — SODIUM CHLORIDE 0.9 % (FLUSH) 0.9 %
5-40 SYRINGE (ML) INJECTION PRN
Status: DISCONTINUED | OUTPATIENT
Start: 2022-12-28 | End: 2022-12-30 | Stop reason: HOSPADM

## 2022-12-28 RX ORDER — SODIUM CHLORIDE 9 MG/ML
25 INJECTION, SOLUTION INTRAVENOUS PRN
Status: DISCONTINUED | OUTPATIENT
Start: 2022-12-28 | End: 2022-12-30 | Stop reason: HOSPADM

## 2022-12-28 RX ORDER — SODIUM CHLORIDE 0.9 % (FLUSH) 0.9 %
5-40 SYRINGE (ML) INJECTION EVERY 12 HOURS SCHEDULED
Status: DISCONTINUED | OUTPATIENT
Start: 2022-12-28 | End: 2022-12-30 | Stop reason: HOSPADM

## 2022-12-28 RX ADMIN — Medication 1 MG: at 14:20

## 2022-12-28 RX ADMIN — SODIUM CHLORIDE: 9 INJECTION, SOLUTION INTRAVENOUS at 14:20

## 2022-12-28 RX ADMIN — MORPHINE SULFATE 5 MG: 10 SOLUTION ORAL at 03:51

## 2022-12-28 RX ADMIN — MORPHINE SULFATE 5 MG: 10 SOLUTION ORAL at 22:03

## 2022-12-28 RX ADMIN — DEXTROSE AND SODIUM CHLORIDE: 5; 450 INJECTION, SOLUTION INTRAVENOUS at 16:44

## 2022-12-28 RX ADMIN — MORPHINE SULFATE 5 MG: 10 SOLUTION ORAL at 11:24

## 2022-12-28 RX ADMIN — GLUCAGON HYDROCHLORIDE 1 MG: KIT at 16:40

## 2022-12-28 ASSESSMENT — PAIN SCALES - PAIN ASSESSMENT IN ADVANCED DEMENTIA (PAINAD)
NEGVOCALIZATION: 2
TOTALSCORE: 4
BODYLANGUAGE: 0
TOTALSCORE: 0
BODYLANGUAGE: 1
BODYLANGUAGE: 1
BREATHING: 0
FACIALEXPRESSION: 0
TOTALSCORE: 9
NEGVOCALIZATION: 0
TOTALSCORE: 6
BREATHING: 0
NEGVOCALIZATION: 2
FACIALEXPRESSION: 2
NEGVOCALIZATION: 1
NEGVOCALIZATION: 2
TOTALSCORE: 6
CONSOLABILITY: 2
CONSOLABILITY: 2
BODYLANGUAGE: 2
FACIALEXPRESSION: 1
BREATHING: 0
BREATHING: 0
CONSOLABILITY: 0
FACIALEXPRESSION: 1
CONSOLABILITY: 2
BODYLANGUAGE: 1
FACIALEXPRESSION: 1
BREATHING: 2
CONSOLABILITY: 0

## 2022-12-28 ASSESSMENT — PAIN SCALES - GENERAL
PAINLEVEL_OUTOF10: 0
PAINLEVEL_OUTOF10: 6

## 2022-12-28 NOTE — RT PROTOCOL NOTE
RT Inhaler-Nebulizer Bronchodilator Protocol Note    There is a bronchodilator order in the chart from a provider indicating to follow the RT Bronchodilator Protocol and there is an Initiate RT Inhaler-Nebulizer Bronchodilator Protocol order as well (see protocol at bottom of note). CXR Findings:  No results found. The findings from the last RT Protocol Assessment were as follows:   History Pulmonary Disease: None or smoker <15 pack years  Respiratory Pattern: Regular pattern and RR 12-20 bpm  Breath Sounds: Slightly diminished and/or crackles  Cough: Strong, spontaneous, non-productive  Indication for Bronchodilator Therapy:    Bronchodilator Assessment Score: 2    Aerosolized bronchodilator medication orders have been revised according to the RT Inhaler-Nebulizer Bronchodilator Protocol below. Respiratory Therapist to perform RT Therapy Protocol Assessment initially then follow the protocol. Repeat RT Therapy Protocol Assessment PRN for score 0-3 or on second treatment, BID, and PRN for scores above 3. No Indications - adjust the frequency to every 6 hours PRN wheezing or bronchospasm, if no treatments needed after 48 hours then discontinue using Per Protocol order mode. If indication present, adjust the RT bronchodilator orders based on the Bronchodilator Assessment Score as indicated below. Use Inhaler orders unless patient has one or more of the following: on home nebulizer, not able to hold breath for 10 seconds, is not alert and oriented, cannot activate and use MDI correctly, or respiratory rate 25 breaths per minute or more, then use the equivalent nebulizer order(s) with same Frequency and PRN reasons based on the score. If a patient is on this medication at home then do not decrease Frequency below that used at home.     0-3 - enter or revise RT bronchodilator order(s) to equivalent RT Bronchodilator order with Frequency of every 4 hours PRN for wheezing or increased work of breathing using Per Protocol order mode. 4-6 - enter or revise RT Bronchodilator order(s) to two equivalent RT bronchodilator orders with one order with BID Frequency and one order with Frequency of every 4 hours PRN wheezing or increased work of breathing using Per Protocol order mode. 7-10 - enter or revise RT Bronchodilator order(s) to two equivalent RT bronchodilator orders with one order with TID Frequency and one order with Frequency of every 4 hours PRN wheezing or increased work of breathing using Per Protocol order mode. 11-13 - enter or revise RT Bronchodilator order(s) to one equivalent RT bronchodilator order with QID Frequency and an Albuterol order with Frequency of every 4 hours PRN wheezing or increased work of breathing using Per Protocol order mode. Greater than 13 - enter or revise RT Bronchodilator order(s) to one equivalent RT bronchodilator order with every 4 hours Frequency and an Albuterol order with Frequency of every 2 hours PRN wheezing or increased work of breathing using Per Protocol order mode. RT to enter RT Home Evaluation for COPD & MDI Assessment order using Per Protocol order mode.     Electronically signed by Julio Walls RCP on 12/27/2022 at 9:34 PM

## 2022-12-28 NOTE — CONSULTS
TRIMMABLE POWER MIDLINE PROCEDURE NOTE  Chart reviewed for allergies, diagnosis, labs, known contraindications, reason for line placement and planned length of treatment. Insertion procedure discussed with patient/family member. Informed consent not required for Midline placement. Three patient identifiers - Patient name,   and MRN -  completed &  confirmed verbally. Hat, mask and eye shield donned. Midline site scrubbed with Chloraprep  One-Step applicator  for 30 seconds x 1. Hand Hygiene  performed with 3% Chlorhexidine surgical scrub x1 min prior to  Sterile gloves, sterile gown being donned. Patient draped using maximal sterile barrier technique ( head to toe ). Midline site scrubbed a 2nd time with Chloraprep One-Step applicator x 30 sec. Vein located by [place] and site marked with sterile pen. 1% Lidocaine 5 ml injected intradermal pre-insertion. Midline inserted. Positive brisk blood return obtained Midline flushed with 10 mls  0.9% Sterile Sodium Chloride. Midline flushes easily with no resistance. Valve placed on all lumens followed by Alcohol Swab Caps on end of each. Skin prep applied to site. CHG dressing in place. Catheter secured with non-sutured locking device per hospital protocol. Sterile Tegaderm applied over Midline site. Sterile field maintained during procedure. Positioning wire accounted for post procedure. Pt. Response to procedure, tolerated well. Appearance of site: Clean dry and intact without bleeding or edema. All edges of Tegaderm occlusive. Site marked with date and initials of RN placing line. Top 2 side rails in up position, call button in reach, RN notified of all of the above. A Trimmable Power Midline  11  CM placed in the  [place] Brachial  vein. 0  cm showing from insertion site.

## 2022-12-28 NOTE — PROGRESS NOTES
Shift assessment complete. Patient resting comfortably. Does not follow commands, opens eyes occasionally, and mumbles. Will continue to monitor.

## 2022-12-28 NOTE — PROGRESS NOTES
Nutrition Note    RECOMMENDATIONS  PO Diet: Diet per SLP. If decision to proceed with hospice, recommend pleasure feeds  ONS: Will continue Boost Pudding and Magic Cup once daily for now. NUTRITION ASSESSMENT   Pt triggered for follow-up. Remains nutritionally compromised AEB inadequate oral intake over length of admission and prior to admission. Continues on dysphagia pureed diet; receiving Ensure, Boost Pudding, and [employer]. Per Jeancarlos Diehl RN, pt is not eating. One documented intake of 0% yesterday. Per palliative note yesterday, daughter is confident that pt would not want permanent PEG; code status changed, family wishes to focus on comfor measures and being referred to hospice. Please reconsult RD should goals of care change and decision made to proceed with initiation of enteral nutrition. Nutrition Related Findings: No BM documented, BS hypoactive. Oriented x1 to person. No edema noted. No new lytes labs obtained today. Wounds: Multiple, Pressure Injury, Unstageable, Deep Tissue Injury  Nutrition Education:  Education not indicated   Nutrition Goals: other (specify) (pleasure feeds, goals of care)     MALNUTRITION ASSESSMENT   Chronic Illness  Malnutrition Status: Insufficient data    NUTRITION DIAGNOSIS   Inadequate oral intake related to inadequate protein-energy intake as evidenced by intake 0-25%  Increased nutrient needs related to increase demand for energy/nutrients as evidenced by wounds    CURRENT NUTRITION THERAPIES  ADULT DIET; Dysphagia - Pureed  ADULT ORAL NUTRITION SUPPLEMENT; Breakfast, Dinner; Standard High Calorie/High Protein Oral Supplement  ADULT ORAL NUTRITION SUPPLEMENT; Lunch;  Fortified Pudding Oral Supplement  ADULT ORAL NUTRITION SUPPLEMENT; Dinner; Frozen Oral Supplement     PO Intake: 0%   PO Supplement Intake:0%    ANTHROPOMETRICS  Current Height: 5' 4\" (162.6 cm)  Current Weight: 113 lb 3.2 oz (51.3 kg)    Ideal Body Weight (IBW): 120 lbs  (55 kg)        BMI: 19. 2    COMPARATIVE STANDARDS  Energy (kcal):  1440 - 1680     Protein (g):  72 - 96       Fluid (mL/day):  1440 - 1680    The patient will be monitored per nutrition standards of care. Consult dietitian if additional nutrition interventions are needed prior to RD reassessment.      Adelina Harmon MS, RD, LD    Contact: [phone]

## 2022-12-28 NOTE — PROGRESS NOTES
[facility]    12/28/2022 1:44 PM        Name: Jeremy Barcenas . Admitted: 12/22/2022  Primary Care Provider: No primary care provider on file. (Tel: None)      Brief History: Hx advanced dementia. Presented from [address] [place] [address] with shortness of breath, had recent positive COVID test at facility. Admitted with UTI, COVID, and sepsis. Subjective:  Limited secondary to mental status. Patient resting in bed, more lethargic today. She did receive morphine short time ago for signs of pain while being repositioned in bed. Patient with multiple pressure wounds.      Reviewed interval ancillary notes    Current Medications  morphine 20MG/ML concentrated solution 5 mg, Q2H PRN  LORazepam (ATIVAN) 2 MG/ML concentrated solution 1 mg, Q2H PRN  glucagon (rDNA) injection 1 mg, PRN  morphine injection 1 mg, Q4H PRN  albuterol sulfate HFA (PROVENTIL;VENTOLIN;PROAIR) 108 (90 Base) MCG/ACT inhaler 2 puff, PRN  levothyroxine (SYNTHROID) tablet 112 mcg, Daily  melatonin tablet 6 mg, Nightly PRN  QUEtiapine (SEROQUEL) tablet 25 mg, Nightly  tamsulosin (FLOMAX) capsule 0.4 mg, Nightly  sodium chloride flush 0.9 % injection 5-40 mL, 2 times per day  ondansetron (ZOFRAN-ODT) disintegrating tablet 4 mg, Q8H PRN  polyethylene glycol (GLYCOLAX) packet 17 g, Daily PRN  acetaminophen (TYLENOL) tablet 650 mg, Q6H PRN   Or  acetaminophen (TYLENOL) suppository 650 mg, Q6H PRN      Objective:  BP (!) 153/81   Pulse 91   Temp 97.5 °F (36.4 °C) (Axillary)   Resp 19   Ht 5' 4\" (1.626 m)   Wt 113 lb 3.2 oz (51.3 kg)   SpO2 90%   BMI 19.43 kg/m²     Intake/Output Summary (Last 24 hours) at 12/28/2022 1344  Last data filed at 12/27/2022 2352  Gross per 24 hour   Intake --   Output 250 ml   Net -250 ml      Wt Readings from Last 3 Encounters:   12/28/22 113 lb 3.2 oz (51.3 kg)   09/30/22 128 lb 4.9 oz (58.2 kg)     General:  Lethargic in NAD  Skin:  Warm and dry. Multiple pressure wounds to left hip, bilateral heels, lateral right and left foot. Neck:  Supple. No JVD appreciated  Chest:  Normal effort. Diminished, poor effort, no wheezes/crackles  Cardiovascular:  RRR, normal S1/S2, no murmur/gallop/rub  Abdomen:  Soft, nontender, +bowel sounds  Extremities:  No edema      Labs and Tests:  CBC:   Recent Labs     12/26/22  0552 12/27/22  0903   WBC 10.6 13.5*   HGB 11.2* 12.5    288     BMP:    Recent Labs     12/26/22  0552 12/27/22  0903    140   K 3.8 3.8    102   CO2 25 25   BUN 16 15   CREATININE 0.6 0.7   GLUCOSE 106* 98     Hepatic: No results for input(s): AST, ALT, ALB, BILITOT, ALKPHOS in the last 72 hours. CXR 12/22/2022:  Lungs are clear      Problem List  Principal Problem:    Sepsis ([facility] [address]  Active Problems:    UTI (urinary tract infection)    HTN (hypertension)    Hypothyroidism    (HFpEF) heart failure with preserved ejection fraction (HCC)    Acute respiratory failure with hypoxia (HCC)    Dementia (HCC)    COPD (chronic obstructive pulmonary disease) ([facility] [address]    COVID-19  Resolved Problems:    * No resolved hospital problems. *       Assessment & Plan:   Sepsis. Based on UTI, tachycardia, tachypnea, leukocytosis on admission. Afebrile past 24 hours, blood cultures with NGTD. Started on empiric ceftriaxone on admission, completed 3 day course. MRSA screening positive, continue contact precautions  COVID-19 / COPD / acute hypoxic respiratory failure. Rapid A/B negative, procalcitonin 0.11, COVID-19 positive (12/22), MRSA screen positive. Continue contact and droplet precautions, O2 sats low to mid 90s on room air. Hypernatremia. Sodium 148 on presentation, likely secondary to dehydration. Resolved with gentle hydration. Hypomagnesemia. Chronic dCHF. Appears compensated. proBNP 1450, CXR clear. No indication for diuretics. Pyuria. UA with moderate leukocytes, 2+ bacteria.  Started on empiric ceftriaxone and completed 3 day course. Urine culture with NGTD. Multiple pressure wounds. Wound care has evaluated, appreciate recs. Failure to thrive. Family reports steady decline in patient condition. Poor po intake. Palliative care evaluated and patient now [facility]. Family to meet with hospice this afternoon, hoping to take patient home with hospice support. Met with daughter Rosendo Sotelo and palliative care. Family now requesting resumption of IV fluids until family makes decision on final disposition. Order placed for midline. Diet: ADULT DIET; Dysphagia - Pureed  ADULT ORAL NUTRITION SUPPLEMENT; Lunch;  Fortified Pudding Oral Supplement  ADULT ORAL NUTRITION SUPPLEMENT; Dinner; Frozen Oral Supplement  Code:DNR-CC  DVT PPX: GERA Rae - CNP   12/28/2022 1:44 PM

## 2022-12-28 NOTE — PROGRESS NOTES
Speech Language Pathology  Attempt/Hold    Rico Solomon  1935    SLP attempted to see pt this date for dysphagia intervention. Chart reviewed, spoke with RN who reports pt has not been accepting PO. Pt with eyes closed and mouth open upon SLP entry, did not verbally respond or follow simple commands. With tactile cue of moist sponge to oral cavity, pt turned away and began to cry. Per RN and chart, hospice consult pending. Will hold intervention and attempt again as appropriate per pt/family wishes and POC.     Dawn Hagan, [address] [facility]  Speech-Language Pathologist  [phone]

## 2022-12-29 LAB
GLUCOSE BLD-MCNC: 85 MG/DL (ref 70–99)
GLUCOSE BLD-MCNC: 89 MG/DL (ref 70–99)
GLUCOSE BLD-MCNC: 92 MG/DL (ref 70–99)
GLUCOSE BLD-MCNC: 95 MG/DL (ref 70–99)
PERFORMED ON: NORMAL

## 2022-12-29 PROCEDURE — 2580000003 HC RX 258: Performed by: NURSE PRACTITIONER

## 2022-12-29 PROCEDURE — 94760 N-INVAS EAR/PLS OXIMETRY 1: CPT

## 2022-12-29 PROCEDURE — 2060000000 HC ICU INTERMEDIATE R&B

## 2022-12-29 PROCEDURE — 6370000000 HC RX 637 (ALT 250 FOR IP): Performed by: NURSE PRACTITIONER

## 2022-12-29 PROCEDURE — 2700000000 HC OXYGEN THERAPY PER DAY

## 2022-12-29 PROCEDURE — 6360000002 HC RX W HCPCS: Performed by: INTERNAL MEDICINE

## 2022-12-29 RX ORDER — LORAZEPAM 2 MG/ML
0.5 CONCENTRATE ORAL
Status: DISCONTINUED | OUTPATIENT
Start: 2022-12-29 | End: 2022-12-29

## 2022-12-29 RX ORDER — MORPHINE SULFATE 20 MG/ML
2.5 SOLUTION ORAL EVERY 4 HOURS PRN
Status: DISCONTINUED | OUTPATIENT
Start: 2022-12-29 | End: 2022-12-29

## 2022-12-29 RX ORDER — MORPHINE SULFATE 20 MG/ML
2.5 SOLUTION ORAL
Status: DISCONTINUED | OUTPATIENT
Start: 2022-12-29 | End: 2022-12-30 | Stop reason: HOSPADM

## 2022-12-29 RX ORDER — LORAZEPAM 2 MG/ML
0.5 CONCENTRATE ORAL EVERY 4 HOURS PRN
Status: DISCONTINUED | OUTPATIENT
Start: 2022-12-29 | End: 2022-12-30 | Stop reason: HOSPADM

## 2022-12-29 RX ORDER — MORPHINE SULFATE 20 MG/ML
2.5 SOLUTION ORAL
Status: DISCONTINUED | OUTPATIENT
Start: 2022-12-29 | End: 2022-12-29

## 2022-12-29 RX ORDER — MORPHINE SULFATE 2 MG/ML
1 INJECTION, SOLUTION INTRAMUSCULAR; INTRAVENOUS EVERY 4 HOURS PRN
Status: DISCONTINUED | OUTPATIENT
Start: 2022-12-29 | End: 2022-12-30

## 2022-12-29 RX ADMIN — DEXTROSE AND SODIUM CHLORIDE: 5; 450 INJECTION, SOLUTION INTRAVENOUS at 04:51

## 2022-12-29 RX ADMIN — MORPHINE SULFATE 1 MG: 2 INJECTION, SOLUTION INTRAMUSCULAR; INTRAVENOUS at 17:45

## 2022-12-29 RX ADMIN — MORPHINE SULFATE 5 MG: 10 SOLUTION ORAL at 02:39

## 2022-12-29 RX ADMIN — DEXTROSE AND SODIUM CHLORIDE: 5; 450 INJECTION, SOLUTION INTRAVENOUS at 16:58

## 2022-12-29 RX ADMIN — MORPHINE SULFATE 2.5 MG: 10 SOLUTION ORAL at 13:44

## 2022-12-29 ASSESSMENT — PAIN SCALES - PAIN ASSESSMENT IN ADVANCED DEMENTIA (PAINAD)
BODYLANGUAGE: 1
BREATHING: 0
CONSOLABILITY: 2
NEGVOCALIZATION: 1
NEGVOCALIZATION: 2
TOTALSCORE: 6
BODYLANGUAGE: 1
BREATHING: 1
BREATHING: 0
BODYLANGUAGE: 1
NEGVOCALIZATION: 2
TOTALSCORE: 2
BREATHING: 0
NEGVOCALIZATION: 2
CONSOLABILITY: 2
BODYLANGUAGE: 0
BREATHING: 1
BODYLANGUAGE: 0
CONSOLABILITY: 2
CONSOLABILITY: 2
TOTALSCORE: 2
FACIALEXPRESSION: 1
TOTALSCORE: 6
TOTALSCORE: 6
CONSOLABILITY: 0
FACIALEXPRESSION: 0
TOTALSCORE: 6
BREATHING: 0
FACIALEXPRESSION: 0
NEGVOCALIZATION: 2
FACIALEXPRESSION: 1
FACIALEXPRESSION: 1
BODYLANGUAGE: 1
CONSOLABILITY: 0
FACIALEXPRESSION: 1
NEGVOCALIZATION: 1

## 2022-12-29 ASSESSMENT — PAIN SCALES - GENERAL
PAINLEVEL_OUTOF10: 5
PAINLEVEL_OUTOF10: 0

## 2022-12-29 NOTE — PROGRESS NOTES
[facility]    12/29/2022 10:28 AM        Name: Jeremy Barcenas . Admitted: 12/22/2022  Primary Care Provider: No primary care provider on file. (Tel: None)      Brief History: Hx advanced dementia. Presented from [address] [place] [address] with shortness of breath, had recent positive COVID test at facility. Admitted with UTI, COVID, and sepsis. Subjective:  Limited secondary to mental status. Patient resting in bed, remains lethargic. She does cry out and moans with repositioning. Discussed with nursing, patient has refusing food and drink this am. Receiving gentle hydration per midline IV placed yesterday. Labs unable to be obtained today.       Reviewed interval ancillary notes    Current Medications  lidocaine PF 1 % injection 5 mL, Once  sodium chloride flush 0.9 % injection 5-40 mL, 2 times per day  sodium chloride flush 0.9 % injection 5-40 mL, PRN  0.9 % sodium chloride infusion, PRN  dextrose 5 % and 0.45 % sodium chloride infusion, Continuous  morphine 20MG/ML concentrated solution 5 mg, Q2H PRN  LORazepam (ATIVAN) 2 MG/ML concentrated solution 1 mg, Q2H PRN  glucagon (rDNA) injection 1 mg, PRN  morphine injection 1 mg, Q4H PRN  albuterol sulfate HFA (PROVENTIL;VENTOLIN;PROAIR) 108 (90 Base) MCG/ACT inhaler 2 puff, PRN  levothyroxine (SYNTHROID) tablet 112 mcg, Daily  melatonin tablet 6 mg, Nightly PRN  QUEtiapine (SEROQUEL) tablet 25 mg, Nightly  tamsulosin (FLOMAX) capsule 0.4 mg, Nightly  sodium chloride flush 0.9 % injection 5-40 mL, 2 times per day  ondansetron (ZOFRAN-ODT) disintegrating tablet 4 mg, Q8H PRN  polyethylene glycol (GLYCOLAX) packet 17 g, Daily PRN  acetaminophen (TYLENOL) tablet 650 mg, Q6H PRN   Or  acetaminophen (TYLENOL) suppository 650 mg, Q6H PRN      Objective:  /64   Pulse 69   Temp 97.2 °F (36.2 °C) (Axillary)   Resp 20   Ht 5' 4\" (1.626 m)   Wt 114 lb 9.6 oz (52 kg)   SpO2 90%   BMI 19.67 kg/m²     Intake/Output Summary (Last 24 hours) at 12/29/2022 1028  Last data filed at 12/29/2022 0445  Gross per 24 hour   Intake --   Output 450 ml   Net -450 ml      Wt Readings from Last 3 Encounters:   12/29/22 114 lb 9.6 oz (52 kg)   09/30/22 128 lb 4.9 oz (58.2 kg)     General:  Somnolent in NAD  Skin:  Warm and dry. Multiple pressure wounds, dressings intact  Chest:  Diminished, no wheezes/rhonchi/rales  Cardiovascular:  RRR, normal S1/S2, no murmur/gallop/rub  Abdomen:  Soft, nontender, +bowel sounds  Extremities:  No edema      Labs and Tests:  CBC:   Recent Labs     12/27/22  0903   WBC 13.5*   HGB 12.5        BMP:    Recent Labs     12/27/22  0903      K 3.8      CO2 25   BUN 15   CREATININE 0.7   GLUCOSE 98     Hepatic: No results for input(s): AST, ALT, ALB, BILITOT, ALKPHOS in the last 72 hours. CXR 12/22/2022:  Lungs are clear      Problem List  Principal Problem:    Sepsis ([facility] [address]  Active Problems:    UTI (urinary tract infection)    HTN (hypertension)    Hypothyroidism    (HFpEF) heart failure with preserved ejection fraction (HCC)    Acute respiratory failure with hypoxia (HCC)    Dementia (HCC)    COPD (chronic obstructive pulmonary disease) ([facility] [address]    COVID-19  Resolved Problems:    * No resolved hospital problems. *       Assessment & Plan:   Sepsis. Based on UTI, tachycardia, tachypnea, leukocytosis on admission. Afebrile past 24 hours, blood cultures with NGTD. Started on empiric ceftriaxone on admission, completed 3 day course. MRSA screening positive, continue contact precautions. Improved. COVID-19 / COPD. Rapid A/B negative, procalcitonin 0.11, COVID-19 positive (12/22), MRSA screen positive. Continue contact and droplet precautions, O2 sats low to mid 90s on room air. Hypernatremia. Sodium 148 on presentation, likely secondary to dehydration. Resolved with gentle hydration. Unable to obtain labs today. Hypomagnesemia. Replaced. Chronic dCHF. Appears compensated. proBNP 1450, CXR clear. No indication for diuretics. Pyuria. UA with moderate leukocytes, 2+ bacteria. Started on empiric ceftriaxone and completed 3 day course. Urine culture with NGTD. Multiple pressure wounds. Wound care has evaluated, appreciate recs. Failure to thrive. Family reports steady decline in patient condition. Poor po intake. Palliative care evaluated and patient now [facility]. Attempt to contact daughter Ricardo Vences who I met with yesterday, message left. Discussed with Palliative Care, family has decided to bring patient home with hospice services, either to [place] or [place]. The issue is hospice doesn't cross state lines nor will transport to [place]. Disposition: Anticipate home with hospice. Diet: ADULT DIET; Dysphagia - Pureed  ADULT ORAL NUTRITION SUPPLEMENT; Lunch;  Fortified Pudding Oral Supplement  ADULT ORAL NUTRITION SUPPLEMENT; Dinner; Frozen Oral Supplement  Code:DNR-CC  DVT PPX: petey Flor, APRN - CNP   12/29/2022 10:28 AM

## 2022-12-29 NOTE — PROGRESS NOTES
Pt remains open mouthed and eyes closed in bed, fidgeting frequently and crying out when touched in any fashion.  Pt does not respond to name or other auditory stimuli

## 2022-12-29 NOTE — PROGRESS NOTES
Unable to obtain blood sample from midline after several flushes and extremity movement. Lab notified and they were unable to locate an adequate vein for draw.  Will notify dayshift RN

## 2022-12-29 NOTE — PROGRESS NOTES
Patient responding to pain. Moans and cries with movement. Unable to draw lab work. Dr notified. 02 sat 90% on 5L. Rests comfortably. Palliative care RN updated. Will continue to monitor.

## 2022-12-29 NOTE — PLAN OF CARE
Received Perfect Serve from Juan Diego Oneil (patient's nurse) regarding decline in patient condition with increased O2 needs. Returned to see patient, no family at bedside. Patient appears comfortable, opens eyes to stimuli. Recently received morphine for pain. Patient mouth breathing, O2 sats currently high 80s on non-rebreather mask. HR and BP are stable. Suspect narcotics contributing factor. Patient is comfort care and awaiting hospice placement. 12/29/2022 4:00 PM. Beatriz Byrnes at length with Agata Cordon (palliative care nurse). Patient's family have decided to bring patient to her home with hospice care. Patient's home has not been lived in for past 3 months. Unsure if heat was on during recent subzero temperatures. She has spoke with Martha Wray regarding decline in condition and Venancio Blanton reiterates desire to keep patient comfortable. 12/29/2022 6:15 PM. Beatriz Byrnes with Manoj Simmons (patient's nurse). Reports patient moans at intervals and receiving morphine for comfort. O2 sats in low 90s with non-rebreather mask. Family is at bedside. No questions or needs at present.

## 2022-12-29 NOTE — PLAN OF CARE
Problem: Discharge Planning  Goal: Discharge to home or other facility with appropriate resources  Outcome: Progressing     Problem: Pain  Goal: Verbalizes/displays adequate comfort level or baseline comfort level  Outcome: Not Progressing     Problem: Neurosensory - Adult  Goal: Achieves stable or improved neurological status  Outcome: Not Progressing  Goal: Absence of seizures  Outcome: Adequate for Discharge  Goal: Remains free of injury related to seizures activity  Outcome: Adequate for Discharge  Goal: Achieves maximal functionality and self care  Outcome: Not Progressing     Problem: Respiratory - Adult  Goal: Achieves optimal ventilation and oxygenation  Outcome: Not Progressing  Note: Pt is now requiring increased oxygen at 5 liters nasal cannula     Problem: Cardiovascular - Adult  Goal: Maintains optimal cardiac output and hemodynamic stability  Outcome: Adequate for Discharge  Goal: Absence of cardiac dysrhythmias or at baseline  Outcome: Adequate for Discharge     Problem: Skin/Tissue Integrity - Adult  Goal: Skin integrity remains intact  Outcome: Adequate for Discharge  Goal: Incisions, wounds, or drain sites healing without S/S of infection  Outcome: Adequate for Discharge  Goal: Oral mucous membranes remain intact  Outcome: Progressing  Note: Oral mucosa is dry and cracking- oral swabs repeatedly used to moisten area     Problem: Musculoskeletal - Adult  Goal: Return mobility to safest level of function  Outcome: Not Progressing  Goal: Maintain proper alignment of affected body part  Outcome: Not Progressing  Goal: Return ADL status to a safe level of function  Outcome: Not Progressing     Problem: Genitourinary - Adult  Goal: Absence of urinary retention  Outcome: Not Progressing  Goal: Urinary catheter remains patent  Outcome: Adequate for Discharge     Problem: Infection - Adult  Goal: Absence of infection at discharge  Outcome: Progressing  Goal: Absence of infection during hospitalization  Outcome: Adequate for Discharge  Goal: Absence of fever/infection during anticipated neutropenic period  Outcome: Adequate for Discharge     Problem: Skin/Tissue Integrity  Goal: Absence of new skin breakdown  Description: 1. Monitor for areas of redness and/or skin breakdown  2. Assess vascular access sites hourly  3. Every 4-6 hours minimum:  Change oxygen saturation probe site  4. Every 4-6 hours:  If on nasal continuous positive airway pressure, respiratory therapy assess nares and determine need for appliance change or resting period.   Outcome: Progressing     Problem: Safety - Adult  Goal: Free from fall injury  Outcome: Adequate for Discharge     Problem: ABCDS Injury Assessment  Goal: Absence of physical injury  Outcome: Adequate for Discharge     Problem: Nutrition Deficit:  Goal: Optimize nutritional status  Outcome: Not Progressing     Problem: Pain  Goal: Verbalizes/displays adequate comfort level or baseline comfort level  Outcome: Not Progressing     Problem: Neurosensory - Adult  Goal: Achieves stable or improved neurological status  Outcome: Not Progressing  Goal: Achieves maximal functionality and self care  Outcome: Not Progressing     Problem: Respiratory - Adult  Goal: Achieves optimal ventilation and oxygenation  Outcome: Not Progressing  Note: Pt is now requiring increased oxygen at 5 liters nasal cannula     Problem: Musculoskeletal - Adult  Goal: Return mobility to safest level of function  Outcome: Not Progressing  Goal: Maintain proper alignment of affected body part  Outcome: Not Progressing  Goal: Return ADL status to a safe level of function  Outcome: Not Progressing     Problem: Genitourinary - Adult  Goal: Absence of urinary retention  Outcome: Not Progressing     Problem: Nutrition Deficit:  Goal: Optimize nutritional status  Outcome: Not Progressing

## 2022-12-29 NOTE — CARE COORDINATION
Aware that Isabel met w/daughters yesterday and plan at that time had been hospice in the home with one of the daughters [place], [place] do not wish for pt to return to [place]. PA stated keeping pt here tonight-pt on non-rebreather @15L as of now. CM cont to follow.   Electronically signed by NILDA Jacobs on 12/29/2022 at 4:41 PM

## 2022-12-29 NOTE — PLAN OF CARE
Problem: Discharge Planning  Goal: Discharge to home or other facility with appropriate resources  12/29/2022 1148 by Belem Durant RN  Outcome: Adequate for Discharge     Problem: Pain  Goal: Verbalizes/displays adequate comfort level or baseline comfort level  12/29/2022 1148 by Belem Durant RN  Outcome: Progressing  12/29/2022 1147 by Belem Durant RN  Outcome: Progressing  65/81/1268 0339 by Davida Kerr RN  Outcome: Not Progressing     Problem: Neurosensory - Adult  Goal: Achieves stable or improved neurological status  12/29/2022 1148 by Belem Durant RN  Outcome: Not Progressing  12/29/2022 1147 by Belem Durant RN  Outcome: Progressing  34/08/7922 7841 by Davida Kerr RN  Outcome: Not Progressing  Goal: Achieves maximal functionality and self care  12/29/2022 1147 by Belem Durant RN  Outcome: Progressing  21/01/1381 4023 by Davida Kerr RN  Outcome: Not Progressing     Problem: Respiratory - Adult  Goal: Achieves optimal ventilation and oxygenation  12/29/2022 1148 by Belem Durant RN  Outcome: Adequate for Discharge  12/29/2022 1147 by Belem Durant RN  Outcome: Progressing  38/33/8644 1719 by Davida Kerr RN  Outcome: Not Progressing  Note: Pt is now requiring increased oxygen at 5 liters nasal cannula     Problem: Musculoskeletal - Adult  Goal: Return mobility to safest level of function  12/29/2022 1148 by Belem Durant RN  Outcome: Adequate for Discharge  12/29/2022 1147 by Belem Durant RN  Outcome: Progressing  83/47/1932 2327 by Davida Kerr RN  Outcome: Not Progressing  Goal: Maintain proper alignment of affected body part  12/29/2022 1147 by Belem Durant RN  Outcome: Progressing  64/64/5522 0880 by Davida Kerr RN  Outcome: Not Progressing  Goal: Return ADL status to a safe level of function  12/29/2022 1147 by Belem Durant RN  Outcome: Progressing  21/48/3762 3793 by Davida Kerr RN  Outcome: Not Progressing     Problem: Genitourinary - Adult  Goal: Absence of urinary retention  12/29/2022 1147 by Belem Durant RN  Outcome: Progressing  42/98/9948 5206 by Davida Kerr RN  Outcome: Not Progressing     Problem: Nutrition Deficit:  Goal: Optimize nutritional status  12/29/2022 1148 by Belem Durant RN  Outcome: Not Progressing  12/29/2022 1147 by Belem Durant RN  Outcome: Progressing  55/70/2881 6341 by Davida Kerr RN  Outcome: Not Progressing

## 2022-12-29 NOTE — PROGRESS NOTES
02 sats in low 80s on 5L per NC. Patient changed to non rebreather, she is mainly mouth breathing. 02 sat 88-89% on non rebreather @ 15L. Forehead 02 sensor placed d/t mottling and poor circulation in fingers. Morphine given per [facility] for comfort. Will continue to monitor.

## 2022-12-30 VITALS
HEIGHT: 64 IN | BODY MASS INDEX: 19.46 KG/M2 | WEIGHT: 114 LBS | SYSTOLIC BLOOD PRESSURE: 106 MMHG | DIASTOLIC BLOOD PRESSURE: 54 MMHG | TEMPERATURE: 99 F | HEART RATE: 90 BPM | OXYGEN SATURATION: 94 % | RESPIRATION RATE: 14 BRPM

## 2022-12-30 PROCEDURE — 6370000000 HC RX 637 (ALT 250 FOR IP): Performed by: NURSE PRACTITIONER

## 2022-12-30 PROCEDURE — 2580000003 HC RX 258: Performed by: STUDENT IN AN ORGANIZED HEALTH CARE EDUCATION/TRAINING PROGRAM

## 2022-12-30 PROCEDURE — 2580000003 HC RX 258: Performed by: NURSE PRACTITIONER

## 2022-12-30 PROCEDURE — 6360000002 HC RX W HCPCS: Performed by: INTERNAL MEDICINE

## 2022-12-30 RX ORDER — ONDANSETRON 4 MG/1
4 TABLET, ORALLY DISINTEGRATING ORAL EVERY 8 HOURS PRN
Qty: 9 TABLET | Refills: 0 | Status: SHIPPED | OUTPATIENT
Start: 2022-12-30

## 2022-12-30 RX ORDER — MORPHINE SULFATE 2 MG/ML
1 INJECTION, SOLUTION INTRAMUSCULAR; INTRAVENOUS
Status: DISCONTINUED | OUTPATIENT
Start: 2022-12-30 | End: 2022-12-30 | Stop reason: HOSPADM

## 2022-12-30 RX ORDER — MORPHINE SULFATE 20 MG/ML
2.5 SOLUTION ORAL
Qty: 7.5 ML | Refills: 0 | Status: SHIPPED | OUTPATIENT
Start: 2022-12-30 | End: 2023-01-04

## 2022-12-30 RX ORDER — LORAZEPAM 2 MG/ML
0.5 CONCENTRATE ORAL EVERY 4 HOURS PRN
Qty: 7 ML | Refills: 0 | Status: SHIPPED | OUTPATIENT
Start: 2022-12-30 | End: 2023-01-04

## 2022-12-30 RX ADMIN — MORPHINE SULFATE 1 MG: 2 INJECTION, SOLUTION INTRAMUSCULAR; INTRAVENOUS at 04:47

## 2022-12-30 RX ADMIN — MORPHINE SULFATE 2.5 MG: 10 SOLUTION ORAL at 13:30

## 2022-12-30 RX ADMIN — MORPHINE SULFATE 2.5 MG: 10 SOLUTION ORAL at 11:05

## 2022-12-30 RX ADMIN — Medication 10 ML: at 08:25

## 2022-12-30 RX ADMIN — SODIUM CHLORIDE, PRESERVATIVE FREE 10 ML: 5 INJECTION INTRAVENOUS at 04:47

## 2022-12-30 RX ADMIN — Medication 0.5 MG: at 11:54

## 2022-12-30 RX ADMIN — MORPHINE SULFATE 1 MG: 2 INJECTION, SOLUTION INTRAMUSCULAR; INTRAVENOUS at 08:37

## 2022-12-30 ASSESSMENT — PAIN SCALES - WONG BAKER
WONGBAKER_NUMERICALRESPONSE: 0
WONGBAKER_NUMERICALRESPONSE: 0

## 2022-12-30 ASSESSMENT — PAIN SCALES - PAIN ASSESSMENT IN ADVANCED DEMENTIA (PAINAD)
TOTALSCORE: 2
BREATHING: 1
FACIALEXPRESSION: 0
CONSOLABILITY: 0
TOTALSCORE: 2
NEGVOCALIZATION: 1
CONSOLABILITY: 0
NEGVOCALIZATION: 1
FACIALEXPRESSION: 0
BODYLANGUAGE: 0
BREATHING: 1
BODYLANGUAGE: 0

## 2022-12-30 ASSESSMENT — PAIN SCALES - GENERAL: PAINLEVEL_OUTOF10: 0

## 2022-12-30 NOTE — DISCHARGE INSTR - COC
Continuity of Care Form    Patient Name: Jaxon Crowell   :  1935  MRN:  [id number]    Admit date:  2022  Discharge date:  ***    Code Status Order: DNR-CC   Advance Directives:     Admitting Physician:  Hayder Darnell DO  PCP: No primary care provider on file.     Discharging Nurse: [facility] Unit/Room#: 0UO-5496/3014-07  Discharging Unit Phone Number: ***    Emergency Contact:   Extended Emergency Contact Information  Primary Emergency Contact: karina downing  Home Phone: [phone]  Relation: Child  Secondary Emergency Contact: eulogio ramirez  Home Phone: [phone]  Relation: Child    Past Surgical History:  Past Surgical History:   Procedure Laterality Date    EYE SURGERY      JOINT REPLACEMENT      TONSILLECTOMY         Immunization History:   Immunization History   Administered Date(s) Administered    COVID-19, PFIZER PURPLE top, DILUTE for use, (age 15 y+), 30mcg/0.3mL 2021, 2021, 10/10/2021       Active Problems:  Patient Active Problem List   Diagnosis Code    UTI (urinary tract infection) N39.0    HTN (hypertension) I10    Hypothyroidism E03.9    Generalized weakness R53.1    Urinary retention R33.9    (HFpEF) heart failure with preserved ejection fraction (HCC) I50.30    Sepsis ([address] A41.9    Acute respiratory failure with hypoxia ([facility] [address] J96.01    Dementia ([facility] [address] F03.90    COPD (chronic obstructive pulmonary disease) ([facility] [address] J44.9    COVID-19 U07.1       Isolation/Infection:   Isolation            Droplet Plus  Contact          Patient Infection Status       Infection Onset Added Last Indicated Last Indicated By Review Planned Expiration Resolved Resolved By    MRSA 22 MRSA DNA Probe, Nasal        COVID-19 22 COVID-19 23      Resolved    COVID-19 (Rule Out) 22 COVID-19 (Ordered)   22 Rule-Out Test Resulted            Nurse Assessment:  Last Vital Signs: BP (!) 108/51   Pulse 80   Temp 99 °F (37.2 °C) (Axillary)   Resp 14   Ht 5' 4\" (1.626 m)   Wt 114 lb (51.7 kg)   SpO2 90%   BMI 19.57 kg/m²     Last documented pain score (0-10 scale): Pain Level: 0  Last Weight:   Wt Readings from Last 1 Encounters:   12/30/22 114 lb (51.7 kg)     Mental Status:  {IP PT MENTAL STATUS:20030}    IV Access:  { SAURABH IV ACCESS:971567173}    Nursing Mobility/ADLs:  Walking   {CHP DME HJAB:411462776}  Transfer  {CHP DME CDES:150135340}  Bathing  {CHP DME RJEE:132414989}  Dressing  {CHP DME IEUW:990364776}  Toileting  {CHP DME QGEX:900593931}  Feeding  {CHP DME VYYR:457674245}  Med Admin  {P DME SANTY:377138667}  Med Delivery   { SAURABH MED Delivery:174455331}    Wound Care Documentation and Therapy:  Wound 12/22/22 Hip Left eschar noted (Active)   Wound Image   12/23/22 1333   Wound Etiology Pressure Unstageable 12/30/22 0000   Dressing Status Dry;Clean; Intact 12/30/22 0000   Wound Cleansed Cleansed with saline 12/30/22 0000   Dressing/Treatment Hydrofera blue; Foam 12/30/22 0000   Dressing Change Due 12/31/22 12/30/22 0000   Wound Length (cm) 4.2 cm 12/23/22 1333   Wound Width (cm) 4 cm 12/23/22 1333   Wound Surface Area (cm^2) 16.8 cm^2 12/23/22 1333   Wound Assessment Eschar dry 12/30/22 0000   Drainage Amount None 12/30/22 0000   Odor None 12/30/22 0000   Joceline-wound Assessment Non-blanchable erythema; Intact 12/30/22 0000   Number of days: 7       Wound 12/22/22 Heel Right (Active)   Wound Image   12/23/22 1333   Dressing Status Clean;Dry; Intact 12/30/22 0000   Wound Cleansed Cleansed with saline 12/30/22 0000   Dressing/Treatment Foam 12/30/22 0000   Offloading for Diabetic Foot Ulcers Offloading boot 12/30/22 0000   Dressing Change Due 12/31/22 12/30/22 0000   Wound Length (cm) 3 cm 12/23/22 1333   Wound Width (cm) 2 cm 12/23/22 1333   Wound Surface Area (cm^2) 6 cm^2 12/23/22 1333   Wound Assessment Eschar dry 12/30/22 0000   Drainage Amount None 12/30/22 0000   Odor None 12/30/22 0000 Joceline-wound Assessment Intact; Blanchable erythema 12/30/22 0000   Number of days: 7       Wound 12/22/22 Heel Left deep tissue injury (Active)   Wound Image   12/23/22 1333   Wound Etiology Deep tissue/Injury 12/30/22 0000   Dressing Status Clean;Dry; Intact 12/30/22 0000   Wound Cleansed Cleansed with saline 12/30/22 0000   Dressing/Treatment Foam 12/30/22 0000   Offloading for Diabetic Foot Ulcers Offloading boot 12/30/22 0000   Dressing Change Due 12/28/22 12/30/22 0000   Wound Length (cm) 3 cm 12/23/22 1333   Wound Width (cm) 2 cm 12/23/22 1333   Wound Surface Area (cm^2) 6 cm^2 12/23/22 1333   Wound Assessment Pink/red 12/30/22 0000   Drainage Amount None 12/30/22 0000   Odor None 12/30/22 0000   Joceline-wound Assessment Blanchable erythema 12/30/22 0000   Wound Thickness Description not for Pressure Injury Partial thickness 12/30/22 0000   Number of days: 7       Wound 12/22/22 Foot Anterior;Right;Lateral unstageable  lateral right foot (Active)   Wound Image   12/23/22 1339   Wound Etiology Pressure Unstageable 12/30/22 0000   Dressing Status Clean;Dry; Intact 12/30/22 0000   Wound Cleansed Cleansed with saline 12/30/22 0000   Dressing/Treatment Foam 12/30/22 0000   Wound Length (cm) 3 cm 12/23/22 1339   Wound Width (cm) 2 cm 12/23/22 1339   Wound Surface Area (cm^2) 6 cm^2 12/23/22 1339   Wound Assessment Eschar dry 12/30/22 0000   Joceline-wound Assessment Non-blanchable erythema 12/30/22 0000   Margins Attached edges; Defined edges 12/30/22 0000   Number of days: 7       Wound 12/22/22 Foot Anterior;Left;Lateral lateral left foot DTI (Active)   Wound Image   12/23/22 1339   Wound Etiology Pressure Unstageable 12/30/22 0000   Dressing Status Intact;Dry;Clean 12/30/22 0000   Wound Cleansed Cleansed with saline 12/30/22 0000   Dressing/Treatment Foam 12/30/22 0000   Wound Length (cm) 3 cm 12/23/22 1339   Wound Width (cm) 2 cm 12/23/22 1339   Wound Surface Area (cm^2) 6 cm^2 12/23/22 1339   Wound Assessment [place]/red 12/30/22 0000   Drainage Amount None 12/30/22 0000   Joceline-wound Assessment Non-blanchable erythema 12/30/22 0000   Number of days: 7       Wound 12/22/22 Foot Anterior; Left dti top of left foot (Active)   Wound Image   12/23/22 1339   Wound Etiology Deep tissue/Injury 12/30/22 0000   Dressing Status Dry;Clean; Intact 12/30/22 0000   Wound Cleansed Cleansed with saline 12/30/22 0000   Dressing/Treatment Foam 12/30/22 0000   Offloading for Diabetic Foot Ulcers Offloading boot 12/30/22 0000   Wound Length (cm) 1.5 cm 12/23/22 1339   Wound Width (cm) 4.5 cm 12/23/22 1339   Wound Surface Area (cm^2) 6.75 cm^2 12/23/22 1339   Wound Assessment Pink/red 12/30/22 0000   Drainage Amount None 12/30/22 0000   Joceline-wound Assessment Intact 12/30/22 0000   Margins Attached edges; Defined edges 12/30/22 0000   Number of days: 7        Elimination:  Continence:    Bowel: {YES / LE:35879}  Bladder: {YES / SZ:89516}  Urinary Catheter: {Urinary Catheter:153130511}   Colostomy/Ileostomy/Ileal Conduit: {YES / CR:01406}       Date of Last BM: ***    Intake/Output Summary (Last 24 hours) at 12/30/2022 1332  Last data filed at 12/30/2022 1323  Gross per 24 hour   Intake 3382.27 ml   Output --   Net 3382.27 ml     I/O last 3 completed shifts:  In: -   Out: 450 [Urine:450]    Safety Concerns:     [employer] Safety Concerns:889297702}    Impairments/Disabilities:      [employer] Impairments/Disabilities:449124395}    Nutrition Therapy:  Current Nutrition Therapy:   [employer] Diet List:102238138}    Routes of Feeding: {CHP DME Other Feedings:815578744}  Liquids: {Slp liquid thickness:58522}  Daily Fluid Restriction: {CHP DME Yes amt example:366230751}  Last Modified Barium Swallow with Video (Video Swallowing Test): {Done Not Done ZWIZ:172531251}    Treatments at the Time of Hospital Discharge:   Respiratory Treatments: ***  Oxygen Therapy:  {Therapy; copd oxygen:27890}  Ventilator:    {MH CC Vent YOUX:818907309}    Rehab Therapies: {THERAPEUTIC INTERVENTION:8071251396}  Weight Bearing Status/Restrictions: [address] [employer] [address] CC Weight Bearin}  Other Medical Equipment (for information only, NOT a DME order):  {EQUIPMENT:217656634}  Other Treatments: ***    Patient's personal belongings (please select all that are sent with patient):  {CHP DME Belongings:654654074}    RN SIGNATURE:  {Esignature:926777705}    CASE MANAGEMENT/SOCIAL WORK SECTION    Inpatient Status Date: ***    Readmission Risk Assessment Score:  Readmission Risk              Risk of Unplanned Readmission:  12           Discharging to Facility/ Agency   Name:   Address:  Phone:  Fax:    Dialysis Facility (if applicable)   Name:  Address:  Dialysis Schedule:  Phone:  Fax:    / signature: {Esignature:358492361}    PHYSICIAN SECTION    Prognosis: Poor    Condition at Discharge: Terminal    Rehab Potential (if transferring to Rehab): N/A    Recommended Labs or Other Treatments After Discharge: hospice care    Physician Certification: I certify the above information and transfer of Jazzy Lopez  is necessary for the continuing treatment of the diagnosis listed and that she requires Hospice for greater 30 days.      Update Admission H&P: No change in H&P    PHYSICIAN SIGNATURE:  Electronically signed by GERA Crawley CNP on 22 at 1:33 PM EST

## 2022-12-30 NOTE — PROGRESS NOTES
CLINICAL PHARMACY NOTE: MEDS TO BEDS    Total # of Prescriptions Filled: 3   The following medications were delivered to the patient:  Zofran 4 mg  Morphine   Ativan      Additional Documentation:  Anna MOY picked up from Outpatient Pharmacy=Signed  Rubi Sanders CPhT

## 2022-12-30 NOTE — PROGRESS NOTES
Speech Language Pathology  Discharge  Rico Solomon   1935     Per chart, plan is for pt to d/c to hospice this date. Pt was initially evaluated by SLP 12/23/2022. Will discharge from SLP caseload at this time. No goals met prior to d/c (see prior SLP notes for details). Please re-consult if indicated.     Thanks,  Claudia Rice, [facility]  Speech Language Pathologist

## 2022-12-30 NOTE — FLOWSHEET NOTE
Limited exam at the request of family at bedside. Example focused on breathing effort, symptoms of pain and end of life care. 12/30/22 0757   Assessment   Charting Type Shift assessment  (Limited assessment, End of life care)   Psychosocial   Psychosocial (WDL) X   Patient Behaviors Not interactive;Nonverbal   Neurological   Neuro (WDL) X   Level of Consciousness 2   Orientation Level Disoriented X4   Cognition Unable to follow commands   Speech Incomprehensible   [place] Coma Scale   Eye Opening 2   Best Verbal Response 2   Best Motor Response 5   [place] Coma Scale Score 9   NIHSS Stroke Scale   NIHSS Stroke Scale Assessed No   HEENT (Head, Ears, Eyes, Nose, & Throat)   Mucous Membrane Dry   Tongue Dry   Respiratory   Respiratory (WDL) X   Respiratory Interventions H.O.B. elevated   Respiratory Pattern Regular   Respiratory Depth Shallow   Respiratory Quality/Effort Dyspnea with exertion   Chest Assessment Chest expansion symmetrical;Trachea midline   Cardiac   Cardiac (WDL) X   Cardiac Regularity Regular   Cardiac Rhythm Sinus rhythm;1° AV Block   Rhythm Interpretation   Heart Rate 80   Skin Integumentary    Skin Integumentary (WDL) X   Skin Color Pale   Skin Condition/Temp Poor turgor   Skin Integrity Ecchymosis; Redness   Location Multiple wounds, scattered bruising   Wound Prevention/Protection Method No   RASS   Lopez Agitation Sedation Scale (RASS) -4  (End of life care)

## 2022-12-30 NOTE — PLAN OF CARE
Problem: Pain  Goal: Verbalizes/displays adequate comfort level or baseline comfort level  Outcome: Not Progressing    End of life care in process. Morphine given Q2. Ativan given X1 throughout shift. Problem: Discharge Planning  Goal: Discharge to home or other facility with appropriate resources  Outcome: Progressing    Discharge to hospice today.

## 2022-12-30 NOTE — PROGRESS NOTES
Pt family at bedside. Pt on non rebreather at 15 lpm sats are 88-91%,, appears comfortable. Poc glucose deferred for comfort measures as well as blood pressure.

## 2022-12-30 NOTE — FLOWSHEET NOTE
Transported to home via EMS for hospice care at 1:53 PM  Family at bedside. Verified all patient and family belongings transported with family. Medications provided from outpatient pharmacy with daughter at bedside. Educated how to administer. Verbal understanding by all family at bedside.         12/30/22 1347   Vitals   Heart Rate 90   BP (!) 106/54   MAP (Calculated) 71   Oxygen Therapy   SpO2 94 %

## 2022-12-30 NOTE — CARE COORDINATION
Per message from Palliative care pt leaving with [facility] at 1:30 via [place] ([facility]     Emerita Trinidad RN, BSN  [phone]

## 2022-12-30 NOTE — DISCHARGE SUMMARY
[facility]    [facility] Demographics    Patient. Sam Brady  Date of Birth. 1935  MRN. [id number]     Primary care provider. No primary care provider on file. (Tel: None)    Admit date: 12/22/2022    Discharge date (blank if same as Note Date): Note Date: 12/30/2022     Reason for Hospitalization. Chief Complaint   Patient presents with    Shortness of Breath    Positive For Covid-19    Other     [facility]  [facility]. COUGH   ALSO PAINFUL BEDSORE ON BACKSIDE         Significant Findings. Principal Problem:    Sepsis ([facility] [address]  Active Problems:    UTI (urinary tract infection)    HTN (hypertension)    Hypothyroidism    (HFpEF) heart failure with preserved ejection fraction (HCC)    Acute respiratory failure with hypoxia (HCC)    Dementia (HCC)    COPD (chronic obstructive pulmonary disease) ([facility] [address]    COVID-19  Resolved Problems:    * No resolved hospital problems. *       Problems and results from this hospitalization that need follow up. None. Home with hospice care    Significant test results and incidental findings. CXR 12/22/2022:  Lungs are clear    Invasive procedures and treatments. None     [facility] Course. Hx advanced dementia. Presented from [address] [place] [address] with shortness of breath, had recent positive COVID test at facility. Admitted with UTI, COVID, and sepsis. Sepsis. Based on UTI, tachycardia, tachypnea, leukocytosis on admission. Blood cultures with NGTD. Started on empiric ceftriaxone on admission, completed 3 day course. MRSA screening positive. COVID-19 / COPD. Rapid A/B negative, procalcitonin 0.11, COVID-19 positive (12/22). No evidence of exacerbation. Patient with increasing O2 requirements, requiring high flow O2 to maintain sats low 90s.  Family has opted for hospice care and decline further testing, wanting to focus on comfort measures only. Hypernatremia. Sodium 148 on presentation, likely secondary to dehydration. Resolved with gentle hydration. Hypomagnesemia. Replaced. Chronic dCHF. Appears compensated, proBNP 1450, CXR clear. No indication for diuretics. Pyuria. UA with moderate leukocytes, 2+ bacteria. Started on empiric ceftriaxone and completed 3 day course. Urine culture with NGTD. Multiple pressure wounds. Wound care has evaluated, appreciate recs. Failure to thrive. Family reports steady decline in patient condition since entering SNF 3 months ago. Palliative care evaluated and patient now [facility]. Family have opted for hospice care and DC to patient home. Patient discharged to home with hospice. Consults. IP CONSULT TO HOSPITALIST  IP CONSULT TO DIETITIAN  IP CONSULT TO PALLIATIVE CARE  IP CONSULT TO HOSPICE    Physical examination on discharge day. BP (!) 108/51   Pulse 80   Temp 99 °F (37.2 °C) (Axillary)   Resp 14   Ht 5' 4\" (1.626 m)   Wt 114 lb (51.7 kg)   SpO2 90%   BMI 19.57 kg/m²   General appearance. Lethargic. Looks comfortable. HEENT. Dry mucus membranes. Cardiovascular. Regular rate and rhythm. Respiratory. Not using accessory muscles, mouth breathing, on non-rebreather mask for O2 sat 90-92%  Gastrointestinal. Abdomen soft, not distended  Extremities. No edema in lower extremities. Skin. Warm, dry    Condition at time of discharge terminal    Medication instructions provided to patient at discharge. Medication List        START taking these medications      LORazepam 2 MG/ML concentrated solution  Commonly known as: ATIVAN  Take 0.25 mLs by mouth every 4 hours as needed (agitation, anxiety) for up to 5 days. morphine 20MG/ML Soln concentrated solution  Take 0.125 mLs by mouth every 2 hours as needed for Pain (For patient comfort) for up to 5 days.      ondansetron 4 MG disintegrating tablet  Commonly known as: ZOFRAN-ODT  Place 1 tablet under the tongue every 8 hours as needed for Nausea or Vomiting            CONTINUE taking these medications      albuterol sulfate  (90 Base) MCG/ACT inhaler  Commonly known as: PROVENTIL;VENTOLIN;PROAIR            STOP taking these medications      acetaminophen 325 MG tablet  Commonly known as: TYLENOL     dronabinol 5 MG capsule  Commonly known as: MARINOL     levothyroxine 112 MCG tablet  Commonly known as: SYNTHROID     melatonin 3 MG Tabs tablet     miconazole nitrate 2 % Oint     NIFEdipine 60 MG extended release tablet  Commonly known as: PROCARDIA XL     oxyCODONE-acetaminophen  MG per tablet  Commonly known as: PERCOCET     polyethylene glycol 17 g packet  Commonly known as: GLYCOLAX     QUEtiapine 25 MG tablet  Commonly known as: SEROQUEL     solifenacin 5 MG tablet  Commonly known as: VESICARE     tamsulosin 0.4 MG capsule  Commonly known as: FLOMAX     therapeutic multivitamin-minerals tablet     vitamin E 400 UNIT capsule               Where to Get Your Medications        You can get these medications from any pharmacy    Bring a paper prescription for each of these medications  LORazepam 2 MG/ML concentrated solution  morphine 20MG/ML Soln concentrated solution  ondansetron 4 MG disintegrating tablet         Home with hospice care. Spent > 30 minutes in discharge process.     Signed:  GERA Rowell CNP     12/30/2022 8:56 AM